# Patient Record
Sex: MALE | Race: WHITE | NOT HISPANIC OR LATINO | Employment: OTHER | URBAN - METROPOLITAN AREA
[De-identification: names, ages, dates, MRNs, and addresses within clinical notes are randomized per-mention and may not be internally consistent; named-entity substitution may affect disease eponyms.]

---

## 2017-01-04 ENCOUNTER — COMMUNICATION - HEALTHEAST (OUTPATIENT)
Dept: PULMONOLOGY | Facility: OTHER | Age: 52
End: 2017-01-04

## 2017-02-01 ENCOUNTER — COMMUNICATION - HEALTHEAST (OUTPATIENT)
Dept: PULMONOLOGY | Facility: OTHER | Age: 52
End: 2017-02-01

## 2017-02-08 ENCOUNTER — RECORDS - HEALTHEAST (OUTPATIENT)
Dept: ADMINISTRATIVE | Facility: OTHER | Age: 52
End: 2017-02-08

## 2017-02-08 ENCOUNTER — OFFICE VISIT - HEALTHEAST (OUTPATIENT)
Dept: PULMONOLOGY | Facility: OTHER | Age: 52
End: 2017-02-08

## 2017-02-08 ENCOUNTER — AMBULATORY - HEALTHEAST (OUTPATIENT)
Dept: PULMONOLOGY | Facility: OTHER | Age: 52
End: 2017-02-08

## 2017-02-08 DIAGNOSIS — Z72.0 TOBACCO ABUSE: ICD-10-CM

## 2017-02-08 DIAGNOSIS — J45.20 MILD INTERMITTENT ASTHMA WITHOUT COMPLICATION: ICD-10-CM

## 2017-02-08 DIAGNOSIS — J45.909 ASTHMA: ICD-10-CM

## 2017-05-09 ENCOUNTER — COMMUNICATION - HEALTHEAST (OUTPATIENT)
Dept: SCHEDULING | Facility: CLINIC | Age: 52
End: 2017-05-09

## 2017-05-17 ENCOUNTER — COMMUNICATION - HEALTHEAST (OUTPATIENT)
Dept: PULMONOLOGY | Facility: OTHER | Age: 52
End: 2017-05-17

## 2017-05-31 ENCOUNTER — COMMUNICATION - HEALTHEAST (OUTPATIENT)
Dept: PULMONOLOGY | Facility: CLINIC | Age: 52
End: 2017-05-31

## 2017-05-31 DIAGNOSIS — J45.40 ASTHMA IN ADULT, MODERATE PERSISTENT, UNCOMPLICATED: ICD-10-CM

## 2017-06-05 ENCOUNTER — AMBULATORY - HEALTHEAST (OUTPATIENT)
Dept: PULMONOLOGY | Facility: OTHER | Age: 52
End: 2017-06-05

## 2017-06-05 DIAGNOSIS — J45.909 ASTHMA: ICD-10-CM

## 2018-06-06 ENCOUNTER — COMMUNICATION - HEALTHEAST (OUTPATIENT)
Dept: PULMONOLOGY | Facility: OTHER | Age: 53
End: 2018-06-06

## 2018-06-06 DIAGNOSIS — J45.909 ASTHMA: ICD-10-CM

## 2018-06-07 ENCOUNTER — AMBULATORY - HEALTHEAST (OUTPATIENT)
Dept: PULMONOLOGY | Facility: OTHER | Age: 53
End: 2018-06-07

## 2018-06-07 DIAGNOSIS — J45.909 ASTHMA: ICD-10-CM

## 2018-07-27 ENCOUNTER — OFFICE VISIT - HEALTHEAST (OUTPATIENT)
Dept: PULMONOLOGY | Facility: OTHER | Age: 53
End: 2018-07-27

## 2018-07-27 DIAGNOSIS — R00.2 PALPITATIONS: ICD-10-CM

## 2018-07-27 LAB
ATRIAL RATE - MUSE: 73 BPM
DIASTOLIC BLOOD PRESSURE - MUSE: NORMAL MMHG
INTERPRETATION ECG - MUSE: NORMAL
P AXIS - MUSE: 75 DEGREES
PR INTERVAL - MUSE: 214 MS
QRS DURATION - MUSE: 104 MS
QT - MUSE: 422 MS
QTC - MUSE: 464 MS
R AXIS - MUSE: 13 DEGREES
SYSTOLIC BLOOD PRESSURE - MUSE: NORMAL MMHG
T AXIS - MUSE: 222 DEGREES
VENTRICULAR RATE- MUSE: 73 BPM

## 2018-07-27 RX ORDER — ASPIRIN 81 MG/1
81 TABLET, CHEWABLE ORAL
Status: SHIPPED | COMMUNITY
Start: 2017-05-11

## 2018-09-17 ENCOUNTER — OFFICE VISIT - HEALTHEAST (OUTPATIENT)
Dept: PULMONOLOGY | Facility: OTHER | Age: 53
End: 2018-09-17

## 2018-09-17 DIAGNOSIS — Z72.0 TOBACCO ABUSE: ICD-10-CM

## 2018-09-17 DIAGNOSIS — B18.2 CHRONIC HEPATITIS C WITHOUT HEPATIC COMA (H): ICD-10-CM

## 2018-09-17 DIAGNOSIS — J45.20 MILD INTERMITTENT ASTHMA WITHOUT COMPLICATION: ICD-10-CM

## 2018-09-17 DIAGNOSIS — I42.9 CARDIOMYOPATHY (H): ICD-10-CM

## 2018-09-17 LAB
ATRIAL RATE - MUSE: 116 BPM
DIASTOLIC BLOOD PRESSURE - MUSE: NORMAL MMHG
INTERPRETATION ECG - MUSE: NORMAL
P AXIS - MUSE: 61 DEGREES
PR INTERVAL - MUSE: 198 MS
QRS DURATION - MUSE: 106 MS
QT - MUSE: 342 MS
QTC - MUSE: 475 MS
R AXIS - MUSE: -11 DEGREES
SYSTOLIC BLOOD PRESSURE - MUSE: NORMAL MMHG
T AXIS - MUSE: 113 DEGREES
VENTRICULAR RATE- MUSE: 116 BPM

## 2018-09-21 ENCOUNTER — OFFICE VISIT - HEALTHEAST (OUTPATIENT)
Dept: CARDIOLOGY | Facility: CLINIC | Age: 53
End: 2018-09-21

## 2018-09-21 DIAGNOSIS — I42.0 DILATED CARDIOMYOPATHY (H): ICD-10-CM

## 2018-09-21 LAB
ANION GAP SERPL CALCULATED.3IONS-SCNC: 7 MMOL/L (ref 5–18)
BUN SERPL-MCNC: 19 MG/DL (ref 8–22)
CALCIUM SERPL-MCNC: 9.5 MG/DL (ref 8.5–10.5)
CHLORIDE BLD-SCNC: 107 MMOL/L (ref 98–107)
CO2 SERPL-SCNC: 26 MMOL/L (ref 22–31)
CREAT SERPL-MCNC: 0.96 MG/DL (ref 0.7–1.3)
GFR SERPL CREATININE-BSD FRML MDRD: >60 ML/MIN/1.73M2
GLUCOSE BLD-MCNC: 106 MG/DL (ref 70–125)
POTASSIUM BLD-SCNC: 4.6 MMOL/L (ref 3.5–5)
SODIUM SERPL-SCNC: 140 MMOL/L (ref 136–145)

## 2018-09-24 LAB — BNP SERPL-MCNC: 978 PG/ML (ref 0–42)

## 2018-09-26 ENCOUNTER — AMBULATORY - HEALTHEAST (OUTPATIENT)
Dept: CARDIOLOGY | Facility: CLINIC | Age: 53
End: 2018-09-26

## 2018-09-26 DIAGNOSIS — R79.89 ELEVATED BRAIN NATRIURETIC PEPTIDE (BNP) LEVEL: ICD-10-CM

## 2018-10-01 ENCOUNTER — HOSPITAL ENCOUNTER (OUTPATIENT)
Dept: CARDIOLOGY | Facility: CLINIC | Age: 53
Discharge: HOME | End: 2018-10-01
Attending: INTERNAL MEDICINE

## 2018-10-01 LAB
AORTIC ROOT: 3.8 CM
AORTIC VALVE MEAN VELOCITY: 56.7 CM/S
AV DIMENSIONLESS INDEX VTI: 0.8
AV MEAN GRADIENT: 1 MMHG
AV PEAK GRADIENT: 2.5 MMHG
AV VALVE AREA: 3 CM2
AV VELOCITY RATIO: 0.9
BSA FOR ECHO PROCEDURE: 1.79 M2
CV BLOOD PRESSURE: NORMAL MMHG
CV ECHO HEIGHT: 69 IN
CV ECHO WEIGHT: 146 LBS
DOP CALC AO PEAK VEL: 79.3 CM/S
DOP CALC AO VTI: 12.5 CM
DOP CALC LVOT AREA: 3.8 CM2
DOP CALC LVOT DIAMETER: 2.2 CM
DOP CALC LVOT PEAK VEL: 72.2 CM/S
DOP CALC LVOT STROKE VOLUME: 38 CM3
DOP CALCLVOT PEAK VEL VTI: 10 CM
ECHO EJECTION FRACTION ESTIMATED: 15 %
EJECTION FRACTION: 21 % (ref 55–75)
FRACTIONAL SHORTENING: 8.5 % (ref 28–44)
INTERVENTRICULAR SEPTUM IN END DIASTOLE: 1 CM (ref 0.6–1)
IVS/PW RATIO: 0.9
LA AREA 1: 25.9 CM2
LA AREA 2: 36.3 CM2
LEFT ATRIUM LENGTH: 5.78 CM
LEFT ATRIUM SIZE: 5.7 CM
LEFT ATRIUM TO AORTIC ROOT RATIO: 1.5 NO UNITS
LEFT ATRIUM VOLUME INDEX: 77.2 ML/M2
LEFT ATRIUM VOLUME: 138.3 ML
LEFT VENTRICLE CARDIAC INDEX: 1.5 L/MIN/M2
LEFT VENTRICLE CARDIAC OUTPUT: 2.7 L/MIN
LEFT VENTRICLE DIASTOLIC VOLUME INDEX: 236.3 CM3/M2 (ref 34–74)
LEFT VENTRICLE DIASTOLIC VOLUME: 423 CM3 (ref 62–150)
LEFT VENTRICLE HEART RATE: 70 BPM
LEFT VENTRICLE MASS INDEX: 252 G/M2
LEFT VENTRICLE SYSTOLIC VOLUME INDEX: 186 CM3/M2 (ref 11–31)
LEFT VENTRICLE SYSTOLIC VOLUME: 333 CM3 (ref 21–61)
LEFT VENTRICULAR INTERNAL DIMENSION IN DIASTOLE: 8.2 CM (ref 4.2–5.8)
LEFT VENTRICULAR INTERNAL DIMENSION IN SYSTOLE: 7.5 CM (ref 2.5–4)
LEFT VENTRICULAR MASS: 451 G
LEFT VENTRICULAR OUTFLOW TRACT MEAN GRADIENT: 1 MMHG
LEFT VENTRICULAR OUTFLOW TRACT MEAN VELOCITY: 46.8 CM/S
LEFT VENTRICULAR OUTFLOW TRACT PEAK GRADIENT: 2 MMHG
LEFT VENTRICULAR POSTERIOR WALL IN END DIASTOLE: 1.1 CM (ref 0.6–1)
LV STROKE VOLUME INDEX: 21.2 ML/M2
MITRAL REGURGITANT VELOCITY TIME INTEGRAL: 135 CM
MR FLOW: 55 CM3
MR MEAN GRADIENT: 54 MMHG
MR MEAN VELOCITY: 342 CM/S
MR PEAK GRADIENT: 92.2 MMHG
MR PISA EROA: 0.4 CM2
MR PISA RADIUS: 0.9 CM
MR PISA VN NYQUIST: 38.5 CM/S
MV E'TISSUE VEL-LAT: 6.73 CM/S
MV E'TISSUE VEL-MED: 2.63 CM/S
MV REGURGITANT VOLUME: 55.1 CC
NUC REST DIASTOLIC VOLUME INDEX: 2336 LBS
NUC REST SYSTOLIC VOLUME INDEX: 69 IN
PISA MR PEAK VEL: 480 CM/S
PR MAX PG: 6 MMHG
PR PEAK VELOCITY: 137 CM/S
TRICUSPID REGURGITATION PEAK PRESSURE GRADIENT: 36 MMHG
TRICUSPID VALVE ANULAR PLANE SYSTOLIC EXCURSION: 1.5 CM
TRICUSPID VALVE PEAK REGURGITANT VELOCITY: 300 CM/S

## 2018-10-01 ASSESSMENT — MIFFLIN-ST. JEOR: SCORE: 1487.63

## 2018-10-15 ENCOUNTER — OFFICE VISIT - HEALTHEAST (OUTPATIENT)
Dept: INTERNAL MEDICINE | Facility: CLINIC | Age: 53
End: 2018-10-15

## 2018-10-15 DIAGNOSIS — E78.5 HYPERLIPIDEMIA: ICD-10-CM

## 2018-10-15 DIAGNOSIS — J45.30 MILD PERSISTENT ASTHMA WITHOUT COMPLICATION: ICD-10-CM

## 2018-10-15 DIAGNOSIS — Z91.030 BEE STING ALLERGY: ICD-10-CM

## 2018-10-15 DIAGNOSIS — F19.11 DRUG ABUSE IN REMISSION (H): ICD-10-CM

## 2018-10-15 DIAGNOSIS — B18.2 CHRONIC HEPATITIS C WITHOUT HEPATIC COMA (H): ICD-10-CM

## 2018-10-15 DIAGNOSIS — Z95.810 S/P ICD (INTERNAL CARDIAC DEFIBRILLATOR) PROCEDURE: ICD-10-CM

## 2018-10-15 DIAGNOSIS — I42.0 DILATED CARDIOMYOPATHY (H): ICD-10-CM

## 2018-10-15 RX ORDER — BUDESONIDE AND FORMOTEROL FUMARATE DIHYDRATE 160; 4.5 UG/1; UG/1
1 AEROSOL RESPIRATORY (INHALATION) 2 TIMES DAILY
Qty: 1 INHALER | Refills: 11 | Status: SHIPPED | OUTPATIENT
Start: 2018-10-15 | End: 2021-07-29

## 2018-10-15 RX ORDER — ALBUTEROL SULFATE 90 UG/1
2 AEROSOL, METERED RESPIRATORY (INHALATION) EVERY 6 HOURS PRN
Qty: 1 INHALER | Refills: 11 | Status: SHIPPED | OUTPATIENT
Start: 2018-10-15 | End: 2021-07-29

## 2018-10-15 RX ORDER — EPINEPHRINE 0.3 MG/.3ML
0.3 INJECTION SUBCUTANEOUS PRN
Qty: 2 | Refills: 3 | Status: SHIPPED | OUTPATIENT
Start: 2018-10-15

## 2018-10-15 ASSESSMENT — MIFFLIN-ST. JEOR: SCORE: 1492.17

## 2018-12-10 ENCOUNTER — COMMUNICATION - HEALTHEAST (OUTPATIENT)
Dept: CARDIOLOGY | Facility: CLINIC | Age: 53
End: 2018-12-10

## 2019-01-18 ENCOUNTER — OFFICE VISIT - HEALTHEAST (OUTPATIENT)
Dept: INTERNAL MEDICINE | Facility: CLINIC | Age: 54
End: 2019-01-18

## 2019-01-18 DIAGNOSIS — B18.2 CHRONIC HEPATITIS C WITHOUT HEPATIC COMA (H): ICD-10-CM

## 2019-01-18 DIAGNOSIS — Z72.0 TOBACCO ABUSE: ICD-10-CM

## 2019-01-18 DIAGNOSIS — J45.30 MILD PERSISTENT ASTHMA WITHOUT COMPLICATION: ICD-10-CM

## 2019-01-18 DIAGNOSIS — F19.10 POLYSUBSTANCE ABUSE (H): ICD-10-CM

## 2019-01-18 DIAGNOSIS — Z51.81 MEDICATION MONITORING ENCOUNTER: ICD-10-CM

## 2019-01-18 DIAGNOSIS — E78.5 HYPERLIPIDEMIA, UNSPECIFIED HYPERLIPIDEMIA TYPE: ICD-10-CM

## 2019-01-18 DIAGNOSIS — R73.03 PREDIABETES: ICD-10-CM

## 2019-01-18 DIAGNOSIS — I50.22 CHRONIC SYSTOLIC (CONGESTIVE) HEART FAILURE (H): ICD-10-CM

## 2019-01-18 DIAGNOSIS — I25.10 NON-OCCLUSIVE CORONARY ARTERY DISEASE: ICD-10-CM

## 2019-01-18 DIAGNOSIS — Z12.5 SCREENING FOR PROSTATE CANCER: ICD-10-CM

## 2019-01-18 DIAGNOSIS — R39.11 URINARY HESITANCY: ICD-10-CM

## 2019-01-18 LAB
ALBUMIN SERPL-MCNC: 3.5 G/DL (ref 3.5–5)
ALP SERPL-CCNC: 95 U/L (ref 45–120)
ALT SERPL W P-5'-P-CCNC: 32 U/L (ref 0–45)
AMPHETAMINES UR QL SCN: ABNORMAL
ANION GAP SERPL CALCULATED.3IONS-SCNC: 9 MMOL/L (ref 5–18)
AST SERPL W P-5'-P-CCNC: 28 U/L (ref 0–40)
BARBITURATES UR QL: ABNORMAL
BENZODIAZ UR QL: ABNORMAL
BILIRUB DIRECT SERPL-MCNC: 0.3 MG/DL
BILIRUB SERPL-MCNC: 1 MG/DL (ref 0–1)
BUN SERPL-MCNC: 15 MG/DL (ref 8–22)
CALCIUM SERPL-MCNC: 8.9 MG/DL (ref 8.5–10.5)
CANNABINOIDS UR QL SCN: ABNORMAL
CHLORIDE BLD-SCNC: 105 MMOL/L (ref 98–107)
CO2 SERPL-SCNC: 25 MMOL/L (ref 22–31)
COCAINE UR QL: ABNORMAL
CREAT SERPL-MCNC: 0.9 MG/DL (ref 0.7–1.3)
CREAT UR-MCNC: 173.5 MG/DL
GFR SERPL CREATININE-BSD FRML MDRD: >60 ML/MIN/1.73M2
GLUCOSE BLD-MCNC: 90 MG/DL (ref 70–125)
HBA1C MFR BLD: 5.4 % (ref 3.5–6)
HGB BLD-MCNC: 13.9 G/DL (ref 14–18)
METHADONE UR QL SCN: ABNORMAL
OPIATES UR QL SCN: ABNORMAL
OXYCODONE UR QL: ABNORMAL
PCP UR QL SCN: ABNORMAL
POTASSIUM BLD-SCNC: 4.2 MMOL/L (ref 3.5–5)
PROT SERPL-MCNC: 6.6 G/DL (ref 6–8)
SODIUM SERPL-SCNC: 139 MMOL/L (ref 136–145)

## 2019-01-18 ASSESSMENT — MIFFLIN-ST. JEOR: SCORE: 1523.92

## 2019-01-19 ENCOUNTER — COMMUNICATION - HEALTHEAST (OUTPATIENT)
Dept: SCHEDULING | Facility: CLINIC | Age: 54
End: 2019-01-19

## 2019-01-19 ENCOUNTER — COMMUNICATION - HEALTHEAST (OUTPATIENT)
Dept: INTERNAL MEDICINE | Facility: CLINIC | Age: 54
End: 2019-01-19

## 2019-01-19 DIAGNOSIS — I42.0 DILATED CARDIOMYOPATHY (H): ICD-10-CM

## 2019-01-19 DIAGNOSIS — I50.22 CHRONIC SYSTOLIC (CONGESTIVE) HEART FAILURE (H): ICD-10-CM

## 2019-01-21 ENCOUNTER — AMBULATORY - HEALTHEAST (OUTPATIENT)
Dept: INTERNAL MEDICINE | Facility: CLINIC | Age: 54
End: 2019-01-21

## 2019-03-28 ENCOUNTER — OFFICE VISIT - HEALTHEAST (OUTPATIENT)
Dept: INTERNAL MEDICINE | Facility: CLINIC | Age: 54
End: 2019-03-28

## 2019-03-28 DIAGNOSIS — I50.22 CHRONIC SYSTOLIC (CONGESTIVE) HEART FAILURE (H): ICD-10-CM

## 2019-03-28 DIAGNOSIS — R73.03 PREDIABETES: ICD-10-CM

## 2019-03-28 DIAGNOSIS — I42.0 DILATED CARDIOMYOPATHY (H): ICD-10-CM

## 2019-03-28 DIAGNOSIS — Z01.818 ENCOUNTER FOR PREOPERATIVE EXAMINATION FOR GENERAL SURGICAL PROCEDURE: ICD-10-CM

## 2019-03-28 DIAGNOSIS — I25.10 NON-OCCLUSIVE CORONARY ARTERY DISEASE: ICD-10-CM

## 2019-03-28 DIAGNOSIS — J45.30 MILD PERSISTENT ASTHMA WITHOUT COMPLICATION: ICD-10-CM

## 2019-03-28 DIAGNOSIS — Z95.810 S/P ICD (INTERNAL CARDIAC DEFIBRILLATOR) PROCEDURE: ICD-10-CM

## 2019-03-28 DIAGNOSIS — F19.10 POLYSUBSTANCE ABUSE (H): ICD-10-CM

## 2019-03-28 DIAGNOSIS — K11.8 MASS OF PAROTID GLAND: ICD-10-CM

## 2019-03-28 LAB
ANION GAP SERPL CALCULATED.3IONS-SCNC: 11 MMOL/L (ref 5–18)
BUN SERPL-MCNC: 23 MG/DL (ref 8–22)
CALCIUM SERPL-MCNC: 9.7 MG/DL (ref 8.5–10.5)
CHLORIDE BLD-SCNC: 105 MMOL/L (ref 98–107)
CO2 SERPL-SCNC: 22 MMOL/L (ref 22–31)
CREAT SERPL-MCNC: 1.08 MG/DL (ref 0.7–1.3)
GFR SERPL CREATININE-BSD FRML MDRD: >60 ML/MIN/1.73M2
GLUCOSE BLD-MCNC: 119 MG/DL (ref 70–125)
HBA1C MFR BLD: 5.2 % (ref 3.5–6)
HGB BLD-MCNC: 15.1 G/DL (ref 14–18)
POTASSIUM BLD-SCNC: 4.1 MMOL/L (ref 3.5–5)
SODIUM SERPL-SCNC: 138 MMOL/L (ref 136–145)

## 2019-03-28 ASSESSMENT — MIFFLIN-ST. JEOR: SCORE: 1519.38

## 2019-03-29 ENCOUNTER — COMMUNICATION - HEALTHEAST (OUTPATIENT)
Dept: INTERNAL MEDICINE | Facility: CLINIC | Age: 54
End: 2019-03-29

## 2019-05-23 ENCOUNTER — COMMUNICATION - HEALTHEAST (OUTPATIENT)
Dept: INTERNAL MEDICINE | Facility: CLINIC | Age: 54
End: 2019-05-23

## 2019-10-24 ENCOUNTER — COMMUNICATION - HEALTHEAST (OUTPATIENT)
Dept: CARDIOLOGY | Facility: CLINIC | Age: 54
End: 2019-10-24

## 2019-12-02 ENCOUNTER — COMMUNICATION - HEALTHEAST (OUTPATIENT)
Dept: PHARMACY | Facility: CLINIC | Age: 54
End: 2019-12-02

## 2019-12-26 ENCOUNTER — COMMUNICATION - HEALTHEAST (OUTPATIENT)
Dept: CARDIOLOGY | Facility: CLINIC | Age: 54
End: 2019-12-26

## 2020-03-02 ENCOUNTER — OFFICE VISIT - HEALTHEAST (OUTPATIENT)
Dept: INTERNAL MEDICINE | Facility: CLINIC | Age: 55
End: 2020-03-02

## 2020-03-02 DIAGNOSIS — E78.5 HYPERLIPIDEMIA, UNSPECIFIED HYPERLIPIDEMIA TYPE: ICD-10-CM

## 2020-03-02 DIAGNOSIS — F19.10 POLYSUBSTANCE ABUSE (H): ICD-10-CM

## 2020-03-02 DIAGNOSIS — I42.0 DILATED CARDIOMYOPATHY (H): ICD-10-CM

## 2020-03-02 DIAGNOSIS — I47.29 NSVT (NONSUSTAINED VENTRICULAR TACHYCARDIA) (H): ICD-10-CM

## 2020-03-02 DIAGNOSIS — I50.22 CHRONIC SYSTOLIC (CONGESTIVE) HEART FAILURE (H): ICD-10-CM

## 2020-03-02 ASSESSMENT — MIFFLIN-ST. JEOR: SCORE: 1483.1

## 2020-05-07 ENCOUNTER — COMMUNICATION - HEALTHEAST (OUTPATIENT)
Dept: CARDIOLOGY | Facility: CLINIC | Age: 55
End: 2020-05-07

## 2021-01-06 ENCOUNTER — COMMUNICATION - HEALTHEAST (OUTPATIENT)
Dept: ADMINISTRATIVE | Facility: CLINIC | Age: 56
End: 2021-01-06

## 2021-03-09 ENCOUNTER — OFFICE VISIT - HEALTHEAST (OUTPATIENT)
Dept: INTERNAL MEDICINE | Facility: CLINIC | Age: 56
End: 2021-03-09

## 2021-03-09 DIAGNOSIS — J45.30 MILD PERSISTENT ASTHMA WITHOUT COMPLICATION: ICD-10-CM

## 2021-03-09 DIAGNOSIS — F19.10 POLYSUBSTANCE ABUSE (H): ICD-10-CM

## 2021-03-09 DIAGNOSIS — I25.10 NON-OCCLUSIVE CORONARY ARTERY DISEASE: ICD-10-CM

## 2021-03-09 DIAGNOSIS — Z51.81 ENCOUNTER FOR THERAPEUTIC DRUG MONITORING: ICD-10-CM

## 2021-03-09 DIAGNOSIS — Z72.0 TOBACCO ABUSE: ICD-10-CM

## 2021-03-09 DIAGNOSIS — F41.1 GENERALIZED ANXIETY DISORDER: ICD-10-CM

## 2021-03-09 DIAGNOSIS — I50.22 CHRONIC SYSTOLIC (CONGESTIVE) HEART FAILURE (H): ICD-10-CM

## 2021-03-09 DIAGNOSIS — I47.29 NSVT (NONSUSTAINED VENTRICULAR TACHYCARDIA) (H): ICD-10-CM

## 2021-03-09 LAB
ANION GAP SERPL CALCULATED.3IONS-SCNC: 9 MMOL/L (ref 5–18)
BUN SERPL-MCNC: 22 MG/DL (ref 8–22)
CALCIUM SERPL-MCNC: 8.3 MG/DL (ref 8.5–10.5)
CHLORIDE BLD-SCNC: 106 MMOL/L (ref 98–107)
CO2 SERPL-SCNC: 23 MMOL/L (ref 22–31)
CREAT SERPL-MCNC: 0.98 MG/DL (ref 0.7–1.3)
GFR SERPL CREATININE-BSD FRML MDRD: >60 ML/MIN/1.73M2
GLUCOSE BLD-MCNC: 102 MG/DL (ref 70–125)
MAGNESIUM SERPL-MCNC: 1.8 MG/DL (ref 1.8–2.6)
POTASSIUM BLD-SCNC: 4.3 MMOL/L (ref 3.5–5)
SODIUM SERPL-SCNC: 138 MMOL/L (ref 136–145)

## 2021-03-09 RX ORDER — ESCITALOPRAM OXALATE 10 MG/1
10 TABLET ORAL DAILY
Qty: 30 TABLET | Refills: 2 | Status: SHIPPED | OUTPATIENT
Start: 2021-03-09 | End: 2021-07-29 | Stop reason: DRUGHIGH

## 2021-03-09 RX ORDER — METOPROLOL SUCCINATE 50 MG/1
50 TABLET, EXTENDED RELEASE ORAL DAILY
Qty: 30 TABLET | Refills: 11 | Status: SHIPPED
Start: 2021-03-09

## 2021-03-09 RX ORDER — LISINOPRIL 5 MG/1
5 TABLET ORAL DAILY
Qty: 90 TABLET | Refills: 3 | Status: SHIPPED
Start: 2021-03-09 | End: 2021-07-29

## 2021-03-09 RX ORDER — HYDROXYZINE HYDROCHLORIDE 10 MG/1
10 TABLET, FILM COATED ORAL EVERY 8 HOURS PRN
Qty: 30 TABLET | Refills: 1 | Status: SHIPPED | OUTPATIENT
Start: 2021-03-09 | End: 2021-07-29 | Stop reason: DRUGHIGH

## 2021-03-09 RX ORDER — FUROSEMIDE 20 MG
20 TABLET ORAL DAILY
Qty: 45 TABLET | Refills: 3 | Status: ON HOLD
Start: 2021-03-09 | End: 2021-07-31

## 2021-03-09 ASSESSMENT — MIFFLIN-ST. JEOR: SCORE: 1474.02

## 2021-03-10 ENCOUNTER — COMMUNICATION - HEALTHEAST (OUTPATIENT)
Dept: INTERNAL MEDICINE | Facility: CLINIC | Age: 56
End: 2021-03-10

## 2021-05-03 ENCOUNTER — COMMUNICATION - HEALTHEAST (OUTPATIENT)
Dept: INTERNAL MEDICINE | Facility: CLINIC | Age: 56
End: 2021-05-03

## 2021-05-03 DIAGNOSIS — E78.5 HYPERLIPIDEMIA, UNSPECIFIED HYPERLIPIDEMIA TYPE: ICD-10-CM

## 2021-05-04 RX ORDER — ATORVASTATIN CALCIUM 40 MG/1
TABLET, FILM COATED ORAL
Qty: 90 TABLET | Refills: 3 | Status: SHIPPED | OUTPATIENT
Start: 2021-05-04 | End: 2022-01-01

## 2021-05-27 NOTE — PROGRESS NOTES
Preoperative Exam    Scheduled Procedure: Cancerous mass removed from right cheek  Surgery Date:     Surgery Location: [facility]    Surgeon:  Dr Echevarria    Assessment/Plan:     1. Encounter for preoperative examination for general surgical procedure  Following cardiac clearance, may proceed with surgery as planned.  Overall risk is relatively low and there are no contraindications to proceeding with surgery and general anesthesia.  He will need to hold aspirin for 1 week prior to surgery.  - Basic Metabolic Panel  - Hemoglobin    2. Mass of parotid gland  Incidentally found 2 small masses in right parotid gland with plans for parotidectomy    3. Chronic systolic (congestive) heart failure (H) with dilated cardiomyopathy  Appears well compensated with current medical management.  He will need to be evaluated by cardiology prior to clearance for surgery.  - Ambulatory referral to Cardiology  - lisinopril (ZESTRIL) 10 MG tablet; Take 1 tablet (10 mg total) by mouth daily.  Dispense: 60 tablet; Refill: 11    On morning of surgery, he should take his usual dose of Toprol-XL.  He should hold lisinopril and furosemide although these should be resumed postoperatively.      5. Non-occlusive coronary artery disease  Stable without any exertional chest pain.  Cardiac clearance plan    6. S/P ICD (internal cardiac defibrillator) procedure      7. Mild persistent asthma without complication  Stable.  Continue Symbicort twice daily.  He should use on morning of surgery    8. Polysubstance abuse (H)  History of THC and methamphetamine abuse.  Recent urine drug screen positive for amphetamine.    9. Prediabetes    - Glycosylated Hemoglobin A1c        Surgical Procedure Risk: Intermediate (reported cardiac risk generally 1-5%)  Have you had prior anesthesia?: Yes  Have you or any family members had a previous anesthesia reaction:  No  Do you or any family members have a history of a clotting or bleeding disorder?: No  Cardiac  Risk Assessment: increased risk for major cardiac complications based on  congestive heart failure    Patient approved for surgery with general or local anesthesia.  Following cardiac clearance        Functional Status: Independent  Patient plans to recover at home with family.     Subjective:      Fan Villagomez is a 53 y.o. male who presents for a preoperative consultation.  History of dilated cardiomyopathy with chronic systolic congestive heart failure.  Nonocclusive coronary artery disease.  History of ICD placement.  Also history of polysubstance abuse including THC and methamphetamines.  Recent ER evaluation for chest pain felt to be noncardiac.  Workup included CTA of head and neck with incidental finding of 2 indeterminate low-density foci in the right parotid gland measuring 1.3 cm and 1 cm.  Evaluated by ENT with recommendation for parotidectomy as malignancy cannot be excluded.    From a CHF standpoint, he appears to be stable.  Denies any dyspnea, orthopnea or edema.  Recent episode of chest pain felt to be noncardiac but will first be evaluated by cardiology prior to clearance for surgery.    He has no history of problems with surgery or general anesthesia.  No personal or family history of DVT or pulmonary embolus.    All other systems reviewed and are negative, other than those listed in the HPI.    Pertinent History  Do you have difficulty breathing or chest pain after walking up a flight of stairs: No  History of obstructive sleep apnea: No  Steroid use in the last 6 months: No  Frequent Aspirin/NSAID use: No  Prior Blood Transfusion: No  Prior Blood Transfusion Reaction: No  If for some reason prior to, during or after the procedure, if it is medically indicated, would you be willing to have a blood transfusion?:  There is no transfusion refusal.    Current Outpatient Medications   Medication Sig Dispense Refill     albuterol (PROAIR HFA;PROVENTIL HFA;VENTOLIN HFA) 90 mcg/actuation inhaler  Inhale 2 puffs every 6 (six) hours as needed for wheezing. 1 Inhaler 11     aspirin 81 mg chewable tablet Chew 81 mg.       atorvastatin (LIPITOR) 40 MG tablet TAKE 1 TAB BY MOUTH DAILY AT BEDTIME.       budesonide-formoterol (SYMBICORT) 160-4.5 mcg/actuation inhaler Inhale 1 puff 2 (two) times a day. 1 Inhaler 11     EPINEPHrine (EPIPEN/ADRENACLICK/AUVI-Q) 0.3 mg/0.3 mL injection Inject 0.3 mL (0.3 mg total) as directed as needed for anaphylaxis. Inject into thigh. 2 Pre-filled Pen Syringe 3     furosemide (LASIX) 20 MG tablet Take 1 tablet (20 mg total) by mouth every other day. 45 tablet 3     metoprolol succinate (TOPROL XL) 25 MG Take 1 tablet (25 mg total) by mouth daily. 30 tablet 11     lisinopril (ZESTRIL) 10 MG tablet Take 1 tablet (10 mg total) by mouth daily. 60 tablet 11     No current facility-administered medications for this visit.         Allergies   Allergen Reactions     No Known Drug Allergies        Patient Active Problem List   Diagnosis     Bee sting allergy     Chronic hepatitis C (H)     Dilated cardiomyopathy (H)     Chronic systolic (congestive) heart failure (H)     Hyperlipidemia     Mild persistent asthma without complication     Non-occlusive coronary artery disease     Palpitations     Prediabetes     S/P ICD (internal cardiac defibrillator) procedure     Tobacco abuse     Urinary hesitancy     Polysubstance abuse (H)     Mass of parotid gland       Past Medical History:   Diagnosis Date     Bee sting allergy 10/15/2018     Chronic hepatitis C (H)     Evaluated by gastroenterology 2015 with fibro-scan showing only very mild fibrosis.  One-year follow-up recommended.  Normal LFTs October 2018.     Chronic systolic (congestive) heart failure (H)     Dilated cardiomyopathy with EF 10-15% and severe mitral regurgitation     Dilated cardiomyopathy (H)     Echocardiogram October 2018 with EF of 10-15% with severe mitral regurgitation and placement of ICD.  Hospitalized October 7, 2018 with  acute CHF     Hyperlipidemia      Mass of parotid gland 3/28/2019    Incidental finding of 2 small masses in right parotid gland     Mild persistent asthma without complication 10/8/2018     Non-occlusive coronary artery disease 2017    Coronary angiogram 2018 with moderate 40-60% lesions involving multiple vessels     Palpitations 2018     Polysubstance abuse (H)     Methamphetamine and THC.  Positive urine drug screen 2019 for THC and amphetamine.  Recommending chemical dependency treatment     Prediabetes     A1c 5.7% May 2017     Pulmonary nodule     No change from  -     S/P ICD (internal cardiac defibrillator) procedure 10/15/2018    Overview:  [employer] single chamber ICD     Tobacco abuse 2014     Urinary hesitancy 2019    BPH suspected.  Symptoms intermittent       Past Surgical History:   Procedure Laterality Date     CARDIAC DEFIBRILLATOR PLACEMENT  10/2018     FOOT SURGERY Left      surgery     HAND SURGERY Right     [facility]      INGUINAL HERNIA REPAIR Right        Social History     Socioeconomic History     Marital status: Single     Spouse name: Not on file     Number of children: Not on file     Years of education: Not on file     Highest education level: Not on file   Occupational History     Not on file   Social Needs     Financial resource strain: Not on file     Food insecurity:     Worry: Not on file     Inability: Not on file     Transportation needs:     Medical: Not on file     Non-medical: Not on file   Tobacco Use     Smoking status: Former Smoker     Packs/day: 1.00     Years: 39.00     Pack years: 39.00     Types: Cigarettes     Last attempt to quit: 2016     Years since quittin.4     Smokeless tobacco: Never Used   Substance and Sexual Activity     Alcohol use: Yes     Comment: rare     Drug use: Yes     Types: Methamphetamines, Marijuana     Comment: infrequent, last used meth      Sexual activity: Yes     Partners:  "Female   Lifestyle     Physical activity:     Days per week: Not on file     Minutes per session: Not on file     Stress: Not on file   Relationships     Social connections:     Talks on phone: Not on file     Gets together: Not on file     Attends Yarsani service: Not on file     Active member of club or organization: Not on file     Attends meetings of clubs or organizations: Not on file     Relationship status: Not on file     Intimate partner violence:     Fear of current or ex partner: Not on file     Emotionally abused: Not on file     Physically abused: Not on file     Forced sexual activity: Not on file   Other Topics Concern     Not on file   Social History Narrative    Lives with his brother, Atul.  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  One son, Don.               Objective:     Vitals:    03/28/19 1101   BP: 122/70   Pulse: (!) 119   SpO2: 98%   Weight: 153 lb (69.4 kg)   Height: 5' 9\" (1.753 m)         Physical Exam:  HEENT normal  RESPIRATORY: Breathing pattern was normal and the chest moved symmetrically.   Lung sounds were normal and there were no rales or wheezes.  CARDIOVASCULAR: Heart rate and rhythm were normal.  S1 and S2 were normal and there were no extra sounds or murmurs. Peripheral pulses in arms and legs were normal.  Jugular venous pressure was normal.  There was no peripheral edema.  No carotid bruits.  GASTROINTESTINAL: The abdomen was normal in contour.  Bowel sounds were present.   Palpation detected no tenderness, mass, or enlarged organs.   SKIN/HAIR/NAILS: No cyanosis or pallor  NEUROLOGIC: Grossly nonfocal  PSYCHIATRIC:  Mood and affect were normal     Patient Instructions     Hold all supplements, aspirin and NSAIDs for 7 days prior to surgery.  Follow your surgeon's direction on when to stop eating and drinking prior to surgery.  Your surgeon will be managing your pain after your surgery.    Take your usual doses of Toprol-XL and lisinopril on morning of surgery " with small sip of water and also use your Symbicort inhaler          Labs:  Potassium 4.1 creatinine 1.08  Hemoglobin 15.1    Immunization History   Administered Date(s) Administered     Hep B, Adult 03/21/2006     Influenza, Seasonal, Inj PF IIV3 10/20/2011     Influenza,seasonal quad, PF, 36+MOS 10/12/2018     Influenza,seasonal, Inj IIV3 10/20/2011     Pneumo Conj 13-V (2010&after) 06/02/2015     Td, Adult, Absorbed 05/01/2005, 05/20/2011     Tdap 06/02/2015           Electronically signed by Aguila Harrison MD 03/28/19 11:03 AM

## 2021-05-27 NOTE — PATIENT INSTRUCTIONS - HE
Hold all supplements, aspirin and NSAIDs for 7 days prior to surgery.  Follow your surgeon's direction on when to stop eating and drinking prior to surgery.  Your surgeon will be managing your pain after your surgery.    Take your usual doses of Toprol-XL and lisinopril on morning of surgery with small sip of water and also use your Symbicort inhaler

## 2021-05-28 ASSESSMENT — ASTHMA QUESTIONNAIRES
ACT_TOTALSCORE: 18
ACT_TOTALSCORE: 24

## 2021-05-29 NOTE — TELEPHONE ENCOUNTER
New Appointment Needed  What is the reason for the visit:  Pre op   Pre-Op Appt Request  When is the surgery? :  05/24/19  [place]  Who is the surgeon? :  He dose not  know and dose not have paperwork he said   What type of surgery is being done?: cutting out of cancer in the side of face   Provider Preference: Any available  How soon do you need to be seen?: today  Waitlist offered?: No  Okay to leave a detailed message:  Yes

## 2021-05-29 NOTE — TELEPHONE ENCOUNTER
Left message for patient to call back and schedule with another provider, Dr. Harrison can't fit him in today. He needs to get information about who is doing the surgery and where it needs to be sent. (A fax number) [facility]

## 2021-05-30 VITALS — BODY MASS INDEX: 23.48 KG/M2 | WEIGHT: 159 LBS

## 2021-06-01 VITALS — WEIGHT: 147 LBS | BODY MASS INDEX: 21.71 KG/M2

## 2021-06-02 VITALS — HEIGHT: 69 IN | BODY MASS INDEX: 21.62 KG/M2 | WEIGHT: 146 LBS

## 2021-06-02 VITALS — BODY MASS INDEX: 22.81 KG/M2 | WEIGHT: 154 LBS | HEIGHT: 69 IN

## 2021-06-02 VITALS — WEIGHT: 153 LBS | HEIGHT: 69 IN | BODY MASS INDEX: 22.66 KG/M2

## 2021-06-02 VITALS — BODY MASS INDEX: 21.56 KG/M2 | WEIGHT: 146 LBS

## 2021-06-02 VITALS — BODY MASS INDEX: 21.74 KG/M2 | WEIGHT: 147.2 LBS

## 2021-06-02 VITALS — BODY MASS INDEX: 21.77 KG/M2 | WEIGHT: 147 LBS | HEIGHT: 69 IN

## 2021-06-03 NOTE — TELEPHONE ENCOUNTER
Received MTM referral from patient's insurance ([employer])     Attempt: 4, 12/2/19  Result: No answer    Thank you for the referral,    Abla Lawson, MTM Coordinator Intern

## 2021-06-04 VITALS
BODY MASS INDEX: 21.48 KG/M2 | DIASTOLIC BLOOD PRESSURE: 52 MMHG | WEIGHT: 145 LBS | HEIGHT: 69 IN | SYSTOLIC BLOOD PRESSURE: 94 MMHG | HEART RATE: 71 BPM | OXYGEN SATURATION: 99 %

## 2021-06-05 VITALS
OXYGEN SATURATION: 97 % | SYSTOLIC BLOOD PRESSURE: 94 MMHG | DIASTOLIC BLOOD PRESSURE: 60 MMHG | WEIGHT: 143 LBS | HEIGHT: 69 IN | BODY MASS INDEX: 21.18 KG/M2 | HEART RATE: 102 BPM | TEMPERATURE: 97.8 F

## 2021-06-06 NOTE — PROGRESS NOTES
Office Visit - Follow Up   Fan Villagomez   54 y.o. male    Date of Visit: 3/2/2020    Chief Complaint   Patient presents with     Hospital Visit Follow Up        Assessment and Plan   1. Dilated cardiomyopathy (H) with chronic systolic congestive heart failure  Recent echocardiogram with EF 20%.  Followed by cardiology.  Continues on furosemide, Toprol-XL, and lisinopril.  He is experiencing orthostatic hypotension feeling lightheaded when he stands up too quickly and his systolic blood pressure is in the 90s.  Will cut back on lisinopril to only 5 mg daily.  - furosemide (LASIX) 20 MG tablet; Take 1 tablet (20 mg total) by mouth every other day.  Dispense: 45 tablet; Refill: 3  - metoprolol succinate (TOPROL XL) 25 MG; Take 1 tablet (25 mg total) by mouth daily.  Dispense: 90 tablet; Refill: 3        3. NSVT (nonsustained ventricular tachycardia) (H)  Hospitalized with NSVT with discharge of ICD.  Not taking metoprolol and using methamphetamine both contributing to episode.    4. Polysubstance abuse (H)  Urging him to avoid methamphetamine    5. Hyperlipidemia, unspecified hyperlipidemia type    - atorvastatin (LIPITOR) 40 MG tablet; TAKE 1 TAB BY MOUTH DAILY AT BEDTIME.  Dispense: 90 tablet; Refill: 3    Return in about 3 months (around 6/2/2020) for Recheck.     History of Present Illness   This 54 y.o. old male with dilated cardiomyopathy with chronic systolic congestive heart failure with ICD placement.  Hospitalized last week after his defibrillator discharged twice.  Found to have nonsustained ventricular tachycardia.  Symptoms included feeling diaphoretic before the defibrillator fired.  Initially treated with amiodarone drip.  Urine tox screen returned positive for methamphetamine and THC.  He admits to smoking marijuana that was inadvertently laced with methamphetamine.  He declined going to chemical dependency.  Also had missed a couple doses of his Toprol-XL.  He is now back on all of his  medications.  Still feels some soreness where his defibrillator fired.  Describing feeling lightheaded when he stands up too quickly.  No increasing shortness of breath or worsening edema.  No longer working.  Permanent disability secondary to CHF symptoms including chronic shortness of breath and decreased exercise tolerance with easy fatigue.    Review of Systems:  Otherwise, a comprehensive review of systems was negative except as noted.     Medications, Allergies and Problem List   Patient Active Problem List   Diagnosis     Bee sting allergy     Chronic hepatitis C (H)     Dilated cardiomyopathy (H)     Chronic systolic (congestive) heart failure (H)     Hyperlipidemia     Mild persistent asthma without complication     Non-occlusive coronary artery disease     Palpitations     Prediabetes     S/P ICD (internal cardiac defibrillator) procedure     Tobacco abuse     Urinary hesitancy     Polysubstance abuse (H)     NSVT (nonsustained ventricular tachycardia) (H)       He has a past surgical history that includes Hand surgery (Right); Inguinal hernia repair (Right); Foot surgery (Left); and Cardiac defibrillator placement (10/2018).    Allergies   Allergen Reactions     No Known Drug Allergies        Current Outpatient Medications   Medication Sig Dispense Refill     albuterol (PROAIR HFA;PROVENTIL HFA;VENTOLIN HFA) 90 mcg/actuation inhaler Inhale 2 puffs every 6 (six) hours as needed for wheezing. 1 Inhaler 11     aspirin 81 mg chewable tablet Chew 81 mg.       atorvastatin (LIPITOR) 40 MG tablet TAKE 1 TAB BY MOUTH DAILY AT BEDTIME. 90 tablet 3     budesonide-formoterol (SYMBICORT) 160-4.5 mcg/actuation inhaler Inhale 1 puff 2 (two) times a day. 1 Inhaler 11     EPINEPHrine (EPIPEN/ADRENACLICK/AUVI-Q) 0.3 mg/0.3 mL injection Inject 0.3 mL (0.3 mg total) as directed as needed for anaphylaxis. Inject into thigh. 2 Pre-filled Pen Syringe 3     furosemide (LASIX) 20 MG tablet Take 1 tablet (20 mg total) by mouth  "every other day. 45 tablet 3     metoprolol succinate (TOPROL XL) 25 MG Take 1 tablet (25 mg total) by mouth daily. 90 tablet 3     lisinopriL (PRINIVIL,ZESTRIL) 5 MG tablet Take 1 tablet (5 mg total) by mouth daily. 90 tablet 3     No current facility-administered medications for this visit.         Physical Exam   General Appearance:   Well-appearing middle-age male    BP 94/52 (Patient Site: Left Arm, Patient Position: Sitting, Cuff Size: Adult Large)   Pulse 71   Ht 5' 9\" (1.753 m)   Wt 145 lb (65.8 kg)   SpO2 99%   BMI 21.41 kg/m      Respiratory: Normal respiratory effort.  Lungs are clear with no rales or wheezes.  Heart: Regular rate and rhythm without murmurs, rubs, or gallops.   Extremities: No peripheral edema.  Neurologic: Grossly nonfocal           Additional Information   Social History     Tobacco Use     Smoking status: Former Smoker     Packs/day: 1.00     Years: 39.00     Pack years: 39.00     Types: Cigarettes     Last attempt to quit: 11/1/2016     Years since quitting: 3.3     Smokeless tobacco: Never Used   Substance Use Topics     Alcohol use: Yes     Comment: rare     Drug use: Yes     Types: Methamphetamines, Marijuana     Comment: infrequent, last used meth 2010              Aguila Harrison MD  "

## 2021-06-08 NOTE — PROGRESS NOTES
Pulmonary Clinic Follow Up    Cc: follow up asthma, tobacco abuse    HPI: 51yoM with history of tobacco abuse and asthma here for follow up. I first met him 12/2014 after he presented to ER for asthma exacerbation and CT scan found a 5-6mm nodule in the right middle lobe. He has had ongoing issues with insurance coverage, unfortunately and has had difficulty affording the increase in [employer] premiums.    Good new is that he hasn't smoked for 3 months, quit just before his birthday. He did go to the ER 12/2016 after he forgot his rescue inhaler at home and was at work and was exposed to the cold. He then ran out of symbicort and could't afford it due to increase in cost. Did get some Symbicort from clinic as samples. Only using once daily as he wants to make it last. Using emergency 1-2 times per week. Does get short of breath with heavy exertion but otherwise doing ok.    CAT: 4+3+4+4+4=19    Current Outpatient Prescriptions   Medication Sig     albuterol (PROVENTIL HFA;VENTOLIN HFA) 90 mcg/actuation inhaler Inhale 2 puffs every 6 (six) hours as needed for wheezing.     budesonide-formoterol (SYMBICORT) 80-4.5 mcg/actuation inhaler Inhale 2 puffs 2 (two) times a day.     Vitals:    02/08/17 0958   BP: 128/68   Pulse: (!) 106   Resp: 19   SpO2: 98%   RA  Gen: NAD  C/V: RRR, S1 and S2.  Resp: clear bilaterally  Ext: no edema, no clubbing    CT scan-personally reviewed with patient. Mild apical scarring-chronic. 6mm nodule in right middle lobe stable x2 years.      ASSESSMENT/PLAN:  1) Asthma-mild intermittent  -Completed paperwork for [employer] program to help with cost  -Encouraged him to take BID once he has an ongoing supply of ICS/LABA    2) Lung Nodule  -Stable x2 years, no further follow up necessary    3) Tobacco abuse-inremission  -encouragement provided    Follow up in 6 months; going forward he could likely be followed by his PCP for this mild asthma as his health maintenance is going to be more important  as he ages.  Rica Neumann MD  Electronically signed on 2/8/2017 2:51 PM

## 2021-06-14 NOTE — TELEPHONE ENCOUNTER
Nely is calling from Home Health .  Patient has been discharged from [facility], and needs to verify that PCP will follow patient for home health care.

## 2021-06-15 NOTE — PROGRESS NOTES
Assessment & Plan     Chronic systolic (congestive) heart failure (H)  Dilated cardiomyopathy secondary to chronic meth abuse.  Recent hospitalization with acute on chronic exacerbation of systolic congestive heart failure.  He was not watching his sodium carefully and gained 20 pounds.  Responded to diuresis with IV Lasix and discharged on Lasix 20 mg daily along with ACE inhibitor and beta-blocker.  Trying to watch his sodium more carefully.  He has stopped using frozen dinners.  Taking medications faithfully.  Denies any increasing shortness of breath, orthopnea, PND or edema.  Continue current doses of Lasix 20 mg daily, lisinopril 5 mg daily and Toprol-XL 50 mg daily.  - metoprolol succinate (TOPROL XL) 50 MG 24 hr tablet  Dispense: 30 tablet; Refill: 11  - furosemide (LASIX) 20 MG tablet  Dispense: 45 tablet; Refill: 3  - lisinopriL (PRINIVIL,ZESTRIL) 5 MG tablet  Dispense: 90 tablet; Refill: 3    NSVT (nonsustained ventricular tachycardia) (H)  History of NSVT with ICD placement.    Polysubstance abuse (H)  History of polysubstance abuse including methamphetamines.  He denies any use in the last 3 months and none in the 2 months leading up to hospitalization.    Non-occlusive coronary artery disease  Continue medical management of nonocclusive coronary artery disease taking aspirin 81 mg daily along with atorvastatin.  We discussed smoking cessation.    Generalized anxiety disorder  Generalized anxiety disorder with frequent panic attacks.  Prescribed lorazepam during hospitalization and is asking for refill.  We discussed the habit-forming nature of this medication and with his history of polysubstance abuse, this would not be an ideal medication for him to continue.  As an alternative, I am recommending hydroxyzine 10 mg every 8 hours as needed for anxiety.  I would also recommend starting Lexapro 10 mg daily which he can continue long-term and hopefully will help control his chronic anxiety symptoms as  well.  We discussed potential side effects of medications and expectation that it will take 3 to 4 weeks for the Lexapro to start working.  - escitalopram oxalate (LEXAPRO) 10 MG tablet  Dispense: 30 tablet; Refill: 2  - hydrOXYzine HCL (ATARAX) 10 MG tablet  Dispense: 30 tablet; Refill: 1    Mild persistent asthma without complication  Continues on Symbicort twice daily    Tobacco abuse  Unfortunately still smoking cigarettes and we discussed cessation.  He is not ready to quit at this time.    Encounter for therapeutic drug monitoring  We will check electrolytes and renal function while on diuretic  - Basic Metabolic Panel  - Magnesium    We also clarified his CODE STATUS.  Records from [facility] indicate that he wishes to be DNR/DNI.  He in fact does not wish that and prefers full code but would not want to be maintained on life support or tube feeding should he be in a state where he is not expected to recover.  It sounds like he has completed an advanced directive.  We will leave his current status as full code.        40 minutes spent on the date of the encounter doing chart review, history and exam, documentation and further activities as noted above  {Provider  Link to [facility] Help Grid :551641}     Return in about 4 months (around 7/9/2021) for Annual physical.    Aguila Harrison MD  [facility]    Subjective       HPI 55-year-old male with history of dilated cardiomyopathy, chronic systolic congestive heart failure, nonocclusive coronary artery disease, hepatitis C, chronic tobacco abuse, asthma, valvular heart disease, hypercholesterolemia and polysubstance abuse.  Hospitalized on December 31 with acute on chronic systolic congestive heart failure.  Noncompliant with sodium restriction eating frozen dinners daily with 20 pound weight gain.  Responded to diuresis with IV Lasix.  [facility] and was prescribed Ativan for generalized  "anxiety.  He is asking for refill.  See assessment and plan for details of visit    Current Outpatient Medications on File Prior to Visit   Medication Sig Dispense Refill     albuterol (PROAIR HFA;PROVENTIL HFA;VENTOLIN HFA) 90 mcg/actuation inhaler Inhale 2 puffs every 6 (six) hours as needed for wheezing. 1 Inhaler 11     aspirin 81 mg chewable tablet Chew 81 mg.       atorvastatin (LIPITOR) 40 MG tablet TAKE 1 TAB BY MOUTH DAILY AT BEDTIME. 90 tablet 3     budesonide-formoterol (SYMBICORT) 160-4.5 mcg/actuation inhaler Inhale 1 puff 2 (two) times a day. 1 Inhaler 11     EPINEPHrine (EPIPEN/ADRENACLICK/AUVI-Q) 0.3 mg/0.3 mL injection Inject 0.3 mL (0.3 mg total) as directed as needed for anaphylaxis. Inject into thigh. 2 Pre-filled Pen Syringe 3     [DISCONTINUED] furosemide (LASIX) 20 MG tablet Take 1 tablet (20 mg total) by mouth every other day. 45 tablet 3     [DISCONTINUED] lisinopriL (PRINIVIL,ZESTRIL) 5 MG tablet Take 1 tablet (5 mg total) by mouth daily. 90 tablet 3     [DISCONTINUED] LORazepam (ATIVAN) 0.5 MG tablet Take 0.5 mg by mouth daily.       [DISCONTINUED] metoprolol succinate (TOPROL XL) 25 MG Take 1 tablet (25 mg total) by mouth daily. 90 tablet 3     No current facility-administered medications on file prior to visit.         Review of Systems  Denies chest pain.  No worsening shortness of breath.  No edema.  12 point ROS is negative other than what is described in assessment and plan and above      Objective    Vitals:    03/09/21 1512   BP: 94/60   Patient Site: Right Arm   Patient Position: Sitting   Cuff Size: Adult Regular   Pulse: (!) 102   Temp: 97.8  F (36.6  C)   TempSrc: Temporal   SpO2: 97%   Weight: 143 lb (64.9 kg)   Height: 5' 9\" (1.753 m)        Physical Exam  Lungs clear bilaterally without rales or wheezes  Heart regular rate and rhythm  No peripheral edema        "

## 2021-06-16 PROBLEM — Z91.030 BEE STING ALLERGY: Status: ACTIVE | Noted: 2018-10-15

## 2021-06-16 PROBLEM — I25.10 NON-OCCLUSIVE CORONARY ARTERY DISEASE: Status: ACTIVE | Noted: 2017-05-31

## 2021-06-16 PROBLEM — J45.30 MILD PERSISTENT ASTHMA WITHOUT COMPLICATION: Status: ACTIVE | Noted: 2018-10-08

## 2021-06-16 PROBLEM — R00.2 PALPITATIONS: Status: ACTIVE | Noted: 2018-07-27

## 2021-06-16 PROBLEM — R39.11 URINARY HESITANCY: Status: ACTIVE | Noted: 2019-01-18

## 2021-06-16 PROBLEM — Z95.810 S/P ICD (INTERNAL CARDIAC DEFIBRILLATOR) PROCEDURE: Status: ACTIVE | Noted: 2018-10-15

## 2021-06-17 NOTE — TELEPHONE ENCOUNTER
Refill Approved    Rx renewed per Medication Renewal Policy. Medication was last renewed on 3/2/20.    [place], [facility] Triage/Med Refill 5/4/2021     Requested Prescriptions   Pending Prescriptions Disp Refills     atorvastatin (LIPITOR) 40 MG tablet 90 tablet 3     Sig: TAKE 1 TAB BY MOUTH DAILY AT BEDTIME.       Statins Refill Protocol (Hmg CoA Reductase Inhibitors) Passed - 5/3/2021  6:12 PM        Passed - PCP or prescribing provider visit in past 12 months      Last office visit with prescriber/PCP: 3/9/2021 Aguila Harrison MD OR same dept: 3/9/2021 Aguila Harrison MD OR same specialty: 3/9/2021 Aguila Harrison MD  Last physical: 3/28/2019 Last MTM visit: Visit date not found   Next visit within 3 mo: Visit date not found  Next physical within 3 mo: Visit date not found  Prescriber OR PCP: Aguila Harrison MD  Last diagnosis associated with med order: 1. Hyperlipidemia, unspecified hyperlipidemia type  - atorvastatin (LIPITOR) 40 MG tablet; TAKE 1 TAB BY MOUTH DAILY AT BEDTIME.  Dispense: 90 tablet; Refill: 3    If protocol passes may refill for 12 months if within 3 months of last provider visit (or a total of 15 months).

## 2021-06-17 NOTE — TELEPHONE ENCOUNTER
Refill Request  Medication name: atorvastatin (LIPITOR) 40 MG tablet  Requested Prescriptions     Pending Prescriptions Disp Refills     atorvastatin (LIPITOR) 40 MG tablet 90 tablet 3     Sig: TAKE 1 TAB BY MOUTH DAILY AT BEDTIME.     Who prescribed the medication: PCP  Last refill on medication: 3/2/2020  [facility]ed [facility]  Last appointment with PCP: 3/9/21  Next appointment: Not due    Chelsy Weaver, RT (R)

## 2021-06-18 NOTE — LETTER
Letter by Aguila Harrison MD at      Author: Aguila Harrison MD Service: -- Author Type: --    Filed:  Encounter Date: 1/19/2019 Status: (Other)       Fan Villagomez  [address] [place] [address]             January 19, 2019         Dear Fermin,    Below are the results from your recent visit:    Resulted Orders   Basic Metabolic Panel   Result Value Ref Range    Sodium 139 136 - 145 mmol/L    Potassium 4.2 3.5 - 5.0 mmol/L    Chloride 105 98 - 107 mmol/L    CO2 25 22 - 31 mmol/L    Anion Gap, Calculation 9 5 - 18 mmol/L    Glucose 90 70 - 125 mg/dL    Calcium 8.9 8.5 - 10.5 mg/dL    BUN 15 8 - 22 mg/dL    Creatinine 0.90 0.70 - 1.30 mg/dL    GFR MDRD Af Amer >60 >60 mL/min/1.73m2    GFR MDRD Non Af Amer >60 >60 mL/min/1.73m2    Narrative    Fasting Glucose reference range is 70-99 mg/dL per  American Diabetes Association (ADA) guidelines.   Glycosylated Hemoglobin A1c   Result Value Ref Range    Hemoglobin A1c 5.4 3.5 - 6.0 %   Drug Abuse 1+, Urine   Result Value Ref Range    Amphetamines (!) Screen Negative     Screen Positive (Confirmation available on request)    Benzodiazepines Screen Negative Screen Negative    Opiates Screen Negative Screen Negative    Phencyclidine Screen Negative Screen Negative    THC (!) Screen Negative     Screen Positive (Confirmation available on request)    Barbiturates Screen Negative Screen Negative    Cocaine Metabolite Screen Negative Screen Negative    Methadone Screen Negative Screen Negative    Oxycodone Screen Negative Screen Negative    Creatinine, Urine 173.5 mg/dL    Narrative    Drug                           Screening Threshold    Amphetamines                    1000 ng/mL  Benzodiazepine                   200 ng/mL  Opiates                          300 ng/mL  Phencyclidine                     25 ng/mL  THC Metabolite                    50 ng/mL  Barbiturates                     200 ng/mL  Cocaine Metabolite               150 ng/mL  Methadone                         300 ng/mL  Oxycodone                        100 ng/mL    Screening results are to be used only for medical purposes.  Unconfirmed screening results are not to be used for non-  medical purposes.   Hemoglobin   Result Value Ref Range    Hemoglobin 13.9 (L) 14.0 - 18.0 g/dL   Hepatic Profile   Result Value Ref Range    Bilirubin, Total 1.0 0.0 - 1.0 mg/dL    Bilirubin, Direct 0.3 <=0.5 mg/dL    Protein, Total 6.6 6.0 - 8.0 g/dL    Albumin 3.5 3.5 - 5.0 g/dL    Alkaline Phosphatase 95 45 - 120 U/L    AST 28 0 - 40 U/L    ALT 32 0 - 45 U/L       Kidney and liver studies look good and prediabetes is stable.    I am concerned that amphetamines are again positive on your urine drug screen.  Would you be interested in referral back to chemical dependency?  Please call me and I can help arrange.    Please call with questions or contact us using [employer].    Sincerely,        Electronically signed by Aguila Harrison MD

## 2021-06-18 NOTE — LETTER
Letter by Aguila Harrison MD at      Author: Aguila Harrison MD Service: -- Author Type: --    Filed:  Encounter Date: 1/21/2019 Status: (Other)         January 21, 2019      Patient: Fan Villagomez   YOB: 1965   Date of Visit: 01/18/2019       To Whom It May Concern:    Fan Villagomez has been diagnosed with chronic systolic congestive heart failure secondary to dilated cardiomyopathy.  His symptoms include chronic shortness of breath, weakness, easy fatigue, decreased exercise tolerance, and dizziness.  This is a serious and life-threatening condition.    As a result, he has been disabled and has not been able to work since May, 2018.  Despite current treatment, I do not see that he will be able to return to work in the foreseeable future and will remain disabled through December, 2019.    He will be reassessed annually to determine future ability to return to employment.    Sincerely,        Electronically signed by Aguila Harrison MD

## 2021-06-19 NOTE — PROGRESS NOTES
Pulmonary Clinic Follow Up    Cc: follow up asthma    HPI: unfortunate 52yoM with history of remote substance abuse (meth 20 years ago) who presents for follow up after over a year's absence.    He was hospitalized 5/2017 with worsening dyspnea and was found to have an EF that went from 40%-->20-25%. He underwent catheterization but didn't require stenting. There does not seem to be a good reason for the decline in his EF. He has been following up with Dr. Marsh of [facility]'s cardiology since then.    He had significant issues with palpitations while at work. He has a physical job as a  at [employer] and has worked there for a long time. He was having such severe palpitations that he was getting light headed and tachypneic and had to stop to catch his breath. It was bad enough that he lost his job. Since not working, he has not had another episode. Last episode was 2 weeks ago. Unfortuantely he didn't call cardiology to notify them of the symptoms.    He denies any additional substance abuse (Marijuana occasionally but no ETOH or other drugs in decades).     Weight is down to 149 (was 220pounds 5 years ago). He is down 7 pounds from 1 year ago as well.     His breathing is also rough at times. He is rationing his Symbicort so uses it on bad days. Hasn't smoked in over a year as well.     One piece of good news: Now a grandfather, son had a son so he now has a grandson.     Vitals:    07/27/18 1620   BP: 106/60   Pulse: 87   Resp: 18   SpO2: 97%   RA  Gen: thin, disheveled but no distress  HEENT: No lymphadenopathy, +Temporal wasting  C/V: RRR, S1 and S2  Resp: clear bilaterally with good air entry, no wheezing  Ext: no edema, no clubbing  Skin: no rashes  Neuro: mild tremor of the hands.    EKG: Personally reviewed, sinus, normal axis, normal intervals. Jpoint elevation in V2 and V3. V4-6 inversion of [place]. Borderline LVH.    ASSESSMENT/PLAN:  Asthma-mild intermittent  -Symbicort if we can get it  covered based on income with [employer]    Palpitations:  EKG today--no abnormalities but concerned about arrhythmia risk due to his EF of 20%. He tells me he did have a monitor previously but didn't show anything.  Attempted to make an appointment with cardiology clinic but they turned phones off at 5pm so I was unable to. I did get the appropriate number for patient to call and attempt to get in sooner than 1 month from now.    Tobacco abuse  In remission, doesn't qualify for screening as he is too young (<55).    Follow up 6 weeks, make sure things still going ok.  He needs a PCP. He didn't like the one he met in 2018. I encouraged him to keep looking because someone needs to keep coordinating his care.   >50% of this 30 minute visit spent in direct patient counseling.  Rica Neumann MD  Electronically signed on 7/27/2018 5:32 PM

## 2021-06-19 NOTE — LETTER
Letter by Aguila Harrison MD at      Author: Aguila Harrison MD Service: -- Author Type: --    Filed:  Encounter Date: 3/29/2019 Status: (Other)         Fan Villagomez  [address] [place] [address]             March 29, 2019         Dear Fermin,    Below are the results from your recent visit:    Resulted Orders   Basic Metabolic Panel   Result Value Ref Range    Sodium 138 136 - 145 mmol/L    Potassium 4.1 3.5 - 5.0 mmol/L    Chloride 105 98 - 107 mmol/L    CO2 22 22 - 31 mmol/L    Anion Gap, Calculation 11 5 - 18 mmol/L    Glucose 119 70 - 125 mg/dL    Calcium 9.7 8.5 - 10.5 mg/dL    BUN 23 (H) 8 - 22 mg/dL    Creatinine 1.08 0.70 - 1.30 mg/dL    GFR MDRD Af Amer >60 >60 mL/min/1.73m2    GFR MDRD Non Af Amer >60 >60 mL/min/1.73m2    Narrative    Fasting Glucose reference range is 70-99 mg/dL per  American Diabetes Association (ADA) guidelines.   Hemoglobin   Result Value Ref Range    Hemoglobin 15.1 14.0 - 18.0 g/dL   Glycosylated Hemoglobin A1c   Result Value Ref Range    Hemoglobin A1c 5.2 3.5 - 6.0 %       Your labs look good.    Please call with questions or contact us using [facility].    Sincerely,        Electronically signed by Aguila Harrison MD

## 2021-06-20 NOTE — PROGRESS NOTES
Pulmonary Clinic Follow Up    Cc: follow up asthma    HPI: unfortunate 52yoM with history of remote substance abuse (meth 20 years ago) who presents for follow up after over a year's absence.    He was hospitalized 5/2017 with worsening dyspnea and was found to have an EF that went from 40%-->20-25%. He underwent catheterization but didn't require stenting. There does not seem to be a good reason for the decline in his EF. He has been following up with Dr. Marsh of [facility] cardiology since then but can't even get an appointment scheduled since may despite multiple attempts and an in person visit.     ACT:4+1+4+3+3=16    Palpitations are much better, less stress. Not on disability, wanted light duty, but can't figure it out.     ROS: Notable for night sweats, visual disturbance, irregular heart beat, shortness of breath, weakness, unable to  heavy things like he used to.     Vitals:    09/17/18 1423   BP: 100/64   Pulse: (!) 116   Resp: 28   SpO2: 98%   RA  Gen: thin, but no distress  HEENT: No lymphadenopathy, +Temporal wasting  C/V: tachycardic, S1 and S2  Resp: clear bilaterally with good air entry, no wheezing  Ext: no edema, no clubbing  Skin: no rashes  Neuro: mild tremor of the hands.    EKG: Sinus tach, rate >100, ST inversions in V4-->V6 that are new. Persistent Jpoint elevations in V1-->V3.    ASSESSMENT/PLAN:  Asthma-mild intermittent  -Symbicort as he can fill it    Palpitations  Non-ischemic cardiomyopathy, EF 20%  -Referral to [facility] cardiology    Tobacco abuse  In remission, still not smoking cigarettes. Congratulations provided. Recommended stopping smoking of any substance.    Imparted to the patient the importance of obtaining a PCP. He needs someone to coordinate his hepatitis C, new cardiomyopathy, asthma and health maintenance. Will try sending him back to [facility] clinic as it is most convenient for him    >50% of this 30 minute visit spent in direct patient  counseling.  Rica Neumann MD  Electronically signed on 9/17/2018 5:32 PM

## 2021-06-21 NOTE — PROGRESS NOTES
Office Visit - Follow Up   Fan Villagomez   52 y.o. male    Date of Visit: 10/15/2018    Chief Complaint   Patient presents with     Establish Care        Assessment and Plan   1. Dilated cardiomyopathy (H)  Chronic systolic congestive heart failure with EF of 10-15% secondary to dilated cardiomyopathy.  Followed by cardiology.  On combination of lisinopril, metoprolol, and furosemide.  - lisinopril (ZESTRIL) 2.5 MG tablet; Take 1 tablet (2.5 mg total) by mouth 2 (two) times a day.  Dispense: 60 tablet; Refill: 5    As a result of his diagnosis, he has been unable to maintain any employment for the past several months since May 2018 and I do not see him being able to return to work in the foreseeable future.  Symptoms include fatigue, decreased exercise tolerance, dizziness, and dyspnea.    2. S/P ICD (internal cardiac defibrillator) procedure  Placement of cardiac defibrillator last week for EF of 10-15%    3. Mild persistent asthma without complication  Using Symbicort daily and albuterol as needed.  Followed by pulmonary    4. Chronic hepatitis C without hepatic coma (H)  I will need to review records regarding previous workup of chronic hepatitis C.  It sounds like he has declined treatment.  Normal LFTs last week during hospitalization.    5. Drug abuse in remission  Former meth user, sober for the past 2 years.  Admits to using THC once or twice weekly and recent urine drug test positive for THC but negative for other substances    6. Hyperlipidemia  Coronary angiogram showing moderate coronary artery disease but no significant stenosis.  Continues atorvastatin daily.  Using aspirin daily    7. Bee sting allergy  We will get him an EpiPen    Return in about 4 weeks (around 11/12/2018) for Recheck.     History of Present Illness   This 52 y.o. old male with complicated medical history returns to the office to establish care with me as his PCP.  Was seen once before here in 2015 by Dr. Kings Colin.  He is  followed closely by both cardiology and pulmonary.  He has chronic asthma using Symbicort daily and albuterol as needed.  Significant cardiac history including dilated cardiomyopathy.  History of substance abuse including methamphetamines but sober for the past 2 years.  He does use THC once or twice per week.  Echo showing EF of 10-15% with severe mitral regurgitation.  Underwent placement of cardiac defibrillator at [facility] last week.  Medical history also significant for chronic hepatitis C.  It does not sound like he has undergone any treatment because of cost.  Denies abdominal pain.  Recent LFTs normal.  He is not interested in addressing at this time.  He has had nonobstructive coronary artery disease seen on coronary angiogram does take a statin and aspirin daily.  Uses metoprolol, lisinopril, and furosemide to manage his CHF.  Quit using tobacco 1 year ago.  Was smoking 1/2-1 pack/day.  He has not been able to work for the past several months since May 2018 because of his CHF symptoms including dyspnea, dizziness, weakness, and decreased exercise tolerance.  He is asking for a letter to pursue disability.    Review of Systems:  Otherwise, a comprehensive review of systems was negative except as noted.     Medications, Allergies and Problem List   Patient Active Problem List   Diagnosis     Hepatitis C     Tobacco abuse     Drug abuse in remission     Palpitations     Cardiomyopathy (H)     Hyperlipidemia     Mild persistent asthma without complication     Non-occlusive coronary artery disease     S/P ICD (internal cardiac defibrillator) procedure     Bee sting allergy       He has a past surgical history that includes Hand surgery (Right); Inguinal hernia repair (Right); and Foot surgery (Left).    Allergies   Allergen Reactions     No Known Drug Allergies        Current Outpatient Prescriptions   Medication Sig Dispense Refill     albuterol (PROAIR HFA;PROVENTIL HFA;VENTOLIN HFA) 90 mcg/actuation  "inhaler Inhale 2 puffs every 6 (six) hours as needed for wheezing. 1 Inhaler 11     aspirin 81 mg chewable tablet Chew 81 mg.       atorvastatin (LIPITOR) 40 MG tablet TAKE 1 TAB BY MOUTH DAILY AT BEDTIME.       budesonide-formoterol (SYMBICORT) 160-4.5 mcg/actuation inhaler Inhale 1 puff 2 (two) times a day. 1 Inhaler 11     furosemide (LASIX) 20 MG tablet Take 20 mg by mouth.       lisinopril (ZESTRIL) 2.5 MG tablet Take 1 tablet (2.5 mg total) by mouth 2 (two) times a day. 60 tablet 5     metoprolol succinate (TOPROL-XL) 50 MG 24 hr tablet Take 50 mg by mouth.       EPINEPHrine (EPIPEN/ADRENACLICK/AUVI-Q) 0.3 mg/0.3 mL injection Inject 0.3 mL (0.3 mg total) as directed as needed for anaphylaxis. Inject into thigh. 2 Pre-filled Pen Syringe 3     No current facility-administered medications for this visit.         Physical Exam   General Appearance:   Stable appearing middle-aged male    /52 (Patient Site: Left Arm, Patient Position: Sitting, Cuff Size: Adult Large)  Pulse 85  Ht 5' 9\" (1.753 m)  Wt 147 lb (66.7 kg)  SpO2 95%  BMI 21.71 kg/m2    HEENT: Normal  Neck without lymphadenopathy  Respiratory: Normal respiratory effort.  Lungs are clear with no rales or wheezes.  Heart: Regular rate and rhythm without murmurs, rubs, or gallops.  No carotid bruits.  Abdomen: Abdomen is soft, nontender without guarding, rebound, masses, or hepatosplenomegaly.  Extremities: No peripheral edema.  Neurologic: Grossly nonfocal  Skin: No cyanosis or pallor  Psych: Alert and oriented ×3, mood appropriate         Additional Information   Social History   Substance Use Topics     Smoking status: Former Smoker     Packs/day: 1.00     Years: 39.00     Types: Cigarettes     Quit date: 11/1/2016     Smokeless tobacco: Never Used     Alcohol use Yes      Comment: rare         Review and/or order of clinical lab tests: Reviewed labs from recent hospitalization October 9-12 including normal LFTs and renal function  Review and/or " order of radiology tests: Reviewed chest x-ray report from October 8, 2018, normal without infiltrates or other abnormality  Review and/or order of medicine tests: Reviewed echo from October 2018 showing EF of 10-15% with severe mitral regurgitation    Review and summarization of old records and/or obtaining history from someone other than the patient and.or discussion of case with another health care provider: Reviewed extensive records including recent hospitalization for placement of cardiac defibrillator last week.  Also reviewed recent pulmonary and cardiology consults for treatment of chronic asthma and of dilated cardiomyopathy with EF of 15% and severe mitral regurgitation.      Time: total time spent with the patient was 40 minutes of which >50% was spent in counseling and coordination of care     Aguila Harrison MD

## 2021-06-21 NOTE — LETTER
Letter by Aguila Harrison MD at      Author: Aguila Harrison MD Service: -- Author Type: --    Filed:  Encounter Date: 3/10/2021 Status: (Other)         Fan Villagomez  [address] [place] [address]             March 10, 2021         Dear Fermin,    Below are the results from your recent visit:    Resulted Orders   Basic Metabolic Panel   Result Value Ref Range    Sodium 138 136 - 145 mmol/L    Potassium 4.3 3.5 - 5.0 mmol/L    Chloride 106 98 - 107 mmol/L    CO2 23 22 - 31 mmol/L    Anion Gap, Calculation 9 5 - 18 mmol/L    Glucose 102 70 - 125 mg/dL    Calcium 8.3 (L) 8.5 - 10.5 mg/dL    BUN 22 8 - 22 mg/dL    Creatinine 0.98 0.70 - 1.30 mg/dL    GFR MDRD Af Amer >60 >60 mL/min/1.73m2    GFR MDRD Non Af Amer >60 >60 mL/min/1.73m2    Narrative    Fasting Glucose reference range is 70-99 mg/dL per  American Diabetes Association (ADA) guidelines.   Magnesium   Result Value Ref Range    Magnesium 1.8 1.8 - 2.6 mg/dL       Kidney function and electrolytes look okay.    Please call with questions or contact us using [employer].    Sincerely,    Electronically signed by Aguila Harrison MD

## 2021-06-23 NOTE — TELEPHONE ENCOUNTER
Patient called said has new Pharmacy, wants meds that  ordered yesterday sent to [facility] instead. (Lasix and Toprol).

## 2021-06-23 NOTE — PROGRESS NOTES
Office Visit - Follow Up   Fan Villagomez   53 y.o. male    Date of Visit: 1/18/2019    Chief Complaint   Patient presents with     Paperwork     for Benefits        Assessment and Plan   1. Chronic systolic (congestive) heart failure (H) secondary to dilated cardiomyopathy  Recent echo with EF unchanged at 10-15%.  There is symptomatic interfering with his ability to continue to work.  Was experiencing dyspnea, dizziness and fatigue whenever trying to perform usual work responsibilities.  He is not able to perform any significant work at this time.  Forms are completed and letter is written.  He will continue to follow-up with cardiology.  We need to clarify his medications whether he is on 10 mg of lisinopril or still on 2.5 mg twice daily.  Toprol-XL 25 mg versus 50 mg and Lasix every other day at 20 mg.  ICD has been placed    2. Non-occlusive coronary artery disease  Continue current medical management including aspirin and statin along with metoprolol    3. Hyperlipidemia, unspecified hyperlipidemia type  Remains on atorvastatin we will recheck lipids when he returns fasting    4. Prediabetes  Continue to monitor A1c  - Glycosylated Hemoglobin A1c    5. Chronic hepatitis C without hepatic coma (H)  Recheck LFTs.  He should follow-up with gastroenterology and will review records  - Hepatic Profile    6. Mild persistent asthma without complication  Stable using Symbicort    7. Polysubstance abuse (H)  We will recheck urine drug screen.  He is adamant that he has not used any methamphetamine for the past 2 years but continues to smoke marijuana on a regular basis  - Drug Abuse 1+, Urine    8. Tobacco abuse  Quit smoking 1 year ago    9. Screening for prostate cancer    - PSA, Annual Screen (Prostatic-Specific Antigen); Future    10. Medication monitoring encounter  Monitor electrolytes  - Basic Metabolic Panel  - Hemoglobin    11. Urinary hesitancy  Declines having a prostate exam.  Probable BPH.  No  obvious side effect of medication.  Will try to get a PSA drawn.    Return in about 4 months (around 5/18/2019) for Recheck.     History of Present Illness   This 53 y.o. old male with dilated cardiomyopathy and nonocclusive coronary artery disease.  Known EF of only 10-15%.  Recent echocardiogram confirming persistent decreased left ventricular systolic function.  He has ICD placed.  Cardiology recommended titrating lisinopril up to 10 mg daily and it is unclear if he is doing that or if he is still on 2.5 mg twice daily.  He is not sure on any of his medications.  Sounds like he is taking his Lasix every other day.  He has not been able to return to work.  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  He has ongoing dyspnea but if he gets frequent rest and spaces himself, he is doing reasonably well and is stable.  Denies worsening edema.  No chest pain or palpitations.  No syncopal episodes.  His ICD has not fired.  He describes some occasional urinary hesitancy where it is hard to pass his urine stream.  He declines having a prostate exam today.  Urine drug screen in October was positive for methamphetamines.  He tells me that he does use marijuana and did smoke something that must have been laced with amphetamines.  He is adamant that he has remained without any use of methamphetamine past 2 years.  Still smokes weed pretty regularly.  Quit cigarettes 1 year ago.    Review of Systems:  Otherwise, a comprehensive review of systems was negative except as noted.     Medications, Allergies and Problem List   Patient Active Problem List   Diagnosis     Bee sting allergy     Chronic hepatitis C (H)     Dilated cardiomyopathy (H)     Chronic systolic (congestive) heart failure (H)     Hyperlipidemia     Mild persistent asthma without complication     Non-occlusive coronary artery disease     Palpitations     Polysubstance abuse (H)      "Prediabetes     S/P ICD (internal cardiac defibrillator) procedure     Tobacco abuse     Urinary hesitancy       He has a past surgical history that includes Hand surgery (Right); Inguinal hernia repair (Right); Foot surgery (Left); and Cardiac defibrillator placement (10/2018).    Allergies   Allergen Reactions     No Known Drug Allergies        Current Outpatient Medications   Medication Sig Dispense Refill     albuterol (PROAIR HFA;PROVENTIL HFA;VENTOLIN HFA) 90 mcg/actuation inhaler Inhale 2 puffs every 6 (six) hours as needed for wheezing. 1 Inhaler 11     aspirin 81 mg chewable tablet Chew 81 mg.       atorvastatin (LIPITOR) 40 MG tablet TAKE 1 TAB BY MOUTH DAILY AT BEDTIME.       budesonide-formoterol (SYMBICORT) 160-4.5 mcg/actuation inhaler Inhale 1 puff 2 (two) times a day. 1 Inhaler 11     furosemide (LASIX) 20 MG tablet Take 1 tablet (20 mg total) by mouth every other day. 45 tablet 3     lisinopril (ZESTRIL) 2.5 MG tablet Take 1 tablet (2.5 mg total) by mouth 2 (two) times a day. 60 tablet 5     EPINEPHrine (EPIPEN/ADRENACLICK/AUVI-Q) 0.3 mg/0.3 mL injection Inject 0.3 mL (0.3 mg total) as directed as needed for anaphylaxis. Inject into thigh. 2 Pre-filled Pen Syringe 3     metoprolol succinate (TOPROL XL) 25 MG Take 1 tablet (25 mg total) by mouth daily. 30 tablet 11     No current facility-administered medications for this visit.         Physical Exam   General Appearance:   Anxious appearing middle-aged male    BP 94/60 (Patient Site: Left Arm, Patient Position: Sitting, Cuff Size: Adult Large)   Pulse 92   Ht 5' 9\" (1.753 m)   Wt 154 lb (69.9 kg)   SpO2 98%   BMI 22.74 kg/m      HEENT: Normal  Respiratory: Normal respiratory effort.  Lungs are clear with no rales or wheezes although diminished breath sounds  Heart: Regular rate and rhythm   Extremities: No peripheral edema.  Neurologic: Grossly nonfocal  Skin: No cyanosis or pallor           Additional Information   Social History     Tobacco " Use     Smoking status: Former Smoker     Packs/day: 1.00     Years: 39.00     Pack years: 39.00     Types: Cigarettes     Last attempt to quit: 2016     Years since quittin.2     Smokeless tobacco: Never Used   Substance Use Topics     Alcohol use: Yes     Comment: rare     Drug use: Yes     Types: Methamphetamines, Marijuana     Comment: infrequent, last used meth          Review and/or order of clinical lab tests: Rechecking A1c along with BMP and hemoglobin    Review and/or order of medicine tests: Reviewed echocardiogram  CONCLUSION:    Severely dilated and spherically remodeled LV.  Global hypokinesis     with EF 15%    Normal RV size and function.     Moderate LA dilatation.     Severe function mitral regurgitation.  Dilated annulus with apically     displaced closure point.     Mild tricuspid regurgitation.     Defibrillator lead noted.     IVC mildly dilated with RA mean pressure 10 mm Hg. Estimated RV     peak systolic pressure 39 mm Hg.      Left Ventricular Ejection Fraction: 15 %     Review and summarization of old records and/or obtaining history from someone other than the patient and.or discussion of case with another health care provider: Reviewed outside records including cardiology appointment in 2018.  His lisinopril was being increased to 10 mg.  Continued on Lasix 20 mg every other day.  Echocardiogram obtained.       Aguila Harrison MD

## 2021-06-26 NOTE — PROGRESS NOTES
Progress Notes by Natacha Reid MD at 9/21/2018 10:50 AM     Author: Natacha Reid MD Service: -- Author Type: Physician    Filed: 9/21/2018 12:56 PM Encounter Date: 9/21/2018 Status: Signed    : Natacha Reid MD (Physician)           Click to link to [facility] HEART CARE NOTE    Thank you, Dr. Neumann, for asking us to see Fan Villagomez at the [facility].      Assessment/Recommendations   Assessment:    1.  Dilated cardiomyopathy: Left ventricular ejection fraction reduced 20% on last echocardiogram done a year ago with moderate mitral regurgitation.  Will recommend repeating his echocardiogram.  Continue on Toprol-XL 50 mg a day.  Add lisinopril 2.5 mg a day.  He is a candidate for prophylactic ICD placement and discussed that with him today.  We will await findings on echocardiogram prior to referral.  2.  Chronic systolic congestive heart failure: NYHA class II heart failure.  Currently appears well compensated on Lasix 20 mg daily.  Will check BMP BNP today.  He has not had recent labs.    3.  Coronary artery disease: Moderate coronary artery disease detected on angiogram done last year in May 2017 at [facility].  No anginal complaints.  Continue daily aspirin and Lipitor 40 mg daily.  4.  Counseled on continued tobacco cessation.  Recommend that he stop smoking marijuana.  He has not used methamphetamines for 2 years.   Follow-up in our heart failure clinic for further med titration in 2 weeks.  Follow-up with me in 1 month.       History of Present Illness    Mr. Fan Villagomez is a 52 y.o. male with history of polysubstance abuse last used methamphetamines 2 years ago, dilated nonischemic cardiomyopathy with left ventricular ejection fraction of 20%, moderate mitral regurgitation, history tobacco abuse and current cannabinoid abuse, COPD, chronic systolic congestive heart failure, hep C, nonobstructive coronary  artery disease on angiogram in May 2017 who I am seeing today for initial consultation to establish care.  He is now being followed at [facility].  He was previously being seen at [facility].  He was not always compliant with his follow-up visits.  He was being seen by Dr. Lela Bonner.  He reports that he has been able to quit smoking tobacco but still smokes marijuana on an almost daily basis.  He last used methamphetamines 2 years ago and denies drinking alcohol.  He follows with Dr. Nel Strauss from pulmonary.  He reports that he feels palpitations when he does anything exertional.  This occurs may be every other day.  Denies any lightheadedness.  He complains of shortness of breath with exertion.  He denies any chest pain.      Coronary angiogram 5/10/2017     Indications:       Cardiomyopathy in diseases classified                        elsewhere, Heart failure, unspecified,                        Shortness of breath, Chest pain,                        unspecified         Conclusions        1. Moderate 50% mid LAD stenosis. FFR is 0.85 which is         not flow limiting        2. Moderate 50% OM1 stenosis. FFR is 0.95 which is not         flow limiting          RA mean: 2mmHg        RV: 20/2        PA: 20/9 (mean 15mmHg)        PCWP mean: 7mmHg        LVEDP: 5mmHg        Barry CO: 3.5 L/min        Barry CI: 1.9 L/min/m2      Recommendations        - Continue management of non-ischemic cardiomyopathy.     Echocardiogram 8/2/17  CONCLUSION:    1.  Severely reduced LV systolic function, EF 20%.    2.  Severe LV dilatation.    3.  Normal RV size and function.    4.  Moderate LA dilatation.    5.  Moderate mitral regurgitation.    6.  In comparison with the prior report, the mitral regurgitation has     improved.  LV size and function are unchanged.      Left Ventricular Ejection Fraction: 20 %        Physical Examination Review of Systems   Vitals:    09/21/18 1058   BP: 100/58   Pulse: 70   Resp: 16      Body mass index is 21.56 kg/(m^2).  Wt Readings from Last 3 Encounters:   09/21/18 146 lb (66.2 kg)   09/17/18 147 lb 3.2 oz (66.8 kg)   07/27/18 147 lb (66.7 kg)       General Appearance:   alert, no apparent distress   HEENT:  no scleral icterus; the mucous membranes are pink and moist                                  Neck: jugular venous pressure normal   Chest: the spine is straight and the chest is symmetric   Lungs:   respirations unlabored; the lungs are clear to auscultation   Cardiovascular:   regular rhythm with normal first and second heart sounds and no murmurs or gallops there are no carotid  bruits.   Abdomen:  no organomegaly, masses, bruits, or tenderness; bowel sounds are present   Extremities: no cyanosis, clubbing, or edema   Skin: no xanthelasma    General: Weight Loss  Eyes: Visual Distubance  Ears/Nose/Throat: WNL  Lungs: Shortness of Breath  Heart: Shortness of Breath with activity (Palpitations)  Stomach: WNL  Bladder: Frequent Urination at Night  Muscle/Joints: Muscle Weakness  Skin: WNL  Nervous System: Daytime Sleepiness, Dizziness  Mental Health: WNL     Blood: WNL     Medical History  Surgical History Family History Social History   Past Medical History:   Diagnosis Date   ? Asthma    Severe dilated nonischemic cardiomyopathy with left ventricular ejection fraction of 20%    History of polysubstance abuse including methamphetamines last use 2 years ago    Chronic systolic congestive heart failure Past Surgical History:   Procedure Laterality Date   ? FOOT SURGERY Left      surgery   ? HAND SURGERY Right    ? INGUINAL HERNIA REPAIR Right     Family History   Problem Relation Age of Onset   ? Kidney failure Mother      dialysis   ? Cancer Father      unknown details   ? Diabetes Brother    ? Heart failure Brother    ? No Medical Problems Brother    ? Drug abuse Brother    ? No Medical Problems Brother    ? No Medical Problems Brother     Social History     Social History   ?  Marital status: Single     Spouse name: N/A   ? Number of children: N/A   ? Years of education: N/A     Occupational History   ? Not on file.     Social History Main Topics   ? Smoking status: Former Smoker     Packs/day: 1.00     Years: 39.00     Types: Cigarettes     Quit date: 11/1/2016   ? Smokeless tobacco: Never Used   ? Alcohol use Yes      Comment: rare   ? Drug use: Yes     Special: Methamphetamines, Marijuana      Comment: infrequent, last used meth 2010   ? Sexual activity: Yes     Partners: Female     Other Topics Concern   ? Not on file     Social History Narrative    Lives with his brother, Atul.  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  One son, Don.            Medications  Allergies   Current Outpatient Prescriptions   Medication Sig Dispense Refill   ? albuterol (PROAIR HFA;PROVENTIL HFA;VENTOLIN HFA) 90 mcg/actuation inhaler Inhale 1-2 puffs.     ? aspirin 81 mg chewable tablet Chew 81 mg.     ? atorvastatin (LIPITOR) 40 MG tablet TAKE 1 TAB BY MOUTH DAILY AT BEDTIME.     ? budesonide-formoterol (SYMBICORT) 160-4.5 mcg/actuation inhaler 1 puff.     ? furosemide (LASIX) 20 MG tablet Take 20 mg by mouth.     ? metoprolol succinate (TOPROL-XL) 50 MG 24 hr tablet Take 50 mg by mouth.     ? lisinopril (ZESTRIL) 2.5 MG tablet Take 1 tablet (2.5 mg total) by mouth daily. 30 tablet 5     No current facility-administered medications for this visit.       Allergies   Allergen Reactions   ? Bee Sting Kit    ? No Known Drug Allergies          Lab Results    Chemistry/lipid CBC Cardiac Enzymes/BNP/TSH/INR   Lab Results   Component Value Date    CHOL 198 06/02/2015    HDL 41 06/02/2015    LDLCALC 81 06/02/2015    TRIG 382 (H) 06/02/2015    CREATININE 0.94 12/14/2016    BUN 16 12/14/2016    K 3.9 12/14/2016     12/14/2016     12/14/2016    CO2 25 12/14/2016    Lab Results   Component Value Date    WBC 12.6 (H) 12/14/2016    HGB 14.9 12/14/2016    HCT 45.2 12/14/2016    MCV 95 12/14/2016      12/14/2016    Lab Results   Component Value Date    TROPONINI <0.01 11/10/2014    TSH 1.46 06/02/2015

## 2021-07-05 ENCOUNTER — COMMUNICATION - HEALTHEAST (OUTPATIENT)
Dept: INTERNAL MEDICINE | Facility: CLINIC | Age: 56
End: 2021-07-05

## 2021-07-05 DIAGNOSIS — F41.1 GENERALIZED ANXIETY DISORDER: ICD-10-CM

## 2021-07-05 RX ORDER — ESCITALOPRAM OXALATE 10 MG/1
TABLET ORAL
Qty: 30 TABLET | Refills: 8 | Status: SHIPPED | OUTPATIENT
Start: 2021-07-05 | End: 2021-07-29

## 2021-07-05 NOTE — TELEPHONE ENCOUNTER
Telephone Encounter by Audra James RN at 7/5/2021 11:47 AM     Author: Audra James RN Service: -- Author Type: Registered Nurse    Filed: 7/5/2021 11:49 AM Encounter Date: 7/5/2021 Status: Signed    : Audra James RN (Registered Nurse)       Refill Approved    Rx renewed per Medication Renewal Policy. Medication was last renewed on 3/9/2021.    Audra James, [facility] Triage/Med Refill 7/5/2021     Requested Prescriptions   Pending Prescriptions Disp Refills   ? escitalopram oxalate (LEXAPRO) 10 MG tablet [Pharmacy Med Name: ESCITALOPRAM 10MG TABLETS] 30 tablet 2     Sig: TAKE 1 TABLET(10 MG) BY MOUTH DAILY       SSRI Refill Protocol  Passed - 7/5/2021  9:57 AM        Passed - PCP or prescribing provider visit in last year     Last office visit with prescriber/PCP: 3/9/2021 Aguila Harrison MD OR same dept: 3/9/2021 Aguila Harrison MD OR same specialty: 3/9/2021 Aguila Harrison MD  Last physical: 3/28/2019 Last MTM visit: Visit date not found   Next visit within 3 mo: Visit date not found  Next physical within 3 mo: Visit date not found  Prescriber OR PCP: Aguila Harrison MD  Last diagnosis associated with med order: 1. Generalized anxiety disorder  - escitalopram oxalate (LEXAPRO) 10 MG tablet [Pharmacy Med Name: ESCITALOPRAM 10MG TABLETS]; TAKE 1 TABLET(10 MG) BY MOUTH DAILY  Dispense: 30 tablet; Refill: 2    If protocol passes may refill for 12 months if within 3 months of last provider visit (or a total of 15 months).

## 2021-07-14 PROBLEM — J45.30 MILD PERSISTENT ASTHMA WITHOUT COMPLICATION: Status: RESOLVED | Noted: 2018-10-08 | Resolved: 2018-11-15

## 2021-07-14 PROBLEM — I25.10 NON-OCCLUSIVE CORONARY ARTERY DISEASE: Status: RESOLVED | Noted: 2017-05-31 | Resolved: 2018-11-15

## 2021-07-29 ENCOUNTER — APPOINTMENT (OUTPATIENT)
Dept: CT IMAGING | Facility: CLINIC | Age: 56
DRG: 292 | End: 2021-07-29
Attending: STUDENT IN AN ORGANIZED HEALTH CARE EDUCATION/TRAINING PROGRAM
Payer: COMMERCIAL

## 2021-07-29 ENCOUNTER — HOSPITAL ENCOUNTER (INPATIENT)
Facility: CLINIC | Age: 56
LOS: 2 days | Discharge: HOME | DRG: 292 | End: 2021-07-31
Attending: STUDENT IN AN ORGANIZED HEALTH CARE EDUCATION/TRAINING PROGRAM | Admitting: FAMILY MEDICINE
Payer: COMMERCIAL

## 2021-07-29 ENCOUNTER — OFFICE VISIT (OUTPATIENT)
Dept: INTERNAL MEDICINE | Facility: CLINIC | Age: 56
End: 2021-07-29
Payer: COMMERCIAL

## 2021-07-29 VITALS — SYSTOLIC BLOOD PRESSURE: 100 MMHG | HEART RATE: 93 BPM | DIASTOLIC BLOOD PRESSURE: 77 MMHG | OXYGEN SATURATION: 95 %

## 2021-07-29 DIAGNOSIS — R35.0 URINARY FREQUENCY: ICD-10-CM

## 2021-07-29 DIAGNOSIS — E78.5 HYPERLIPIDEMIA, UNSPECIFIED HYPERLIPIDEMIA TYPE: ICD-10-CM

## 2021-07-29 DIAGNOSIS — R06.09 DYSPNEA ON EXERTION: ICD-10-CM

## 2021-07-29 DIAGNOSIS — I42.0 DILATED CARDIOMYOPATHY (H): ICD-10-CM

## 2021-07-29 DIAGNOSIS — R73.03 PREDIABETES: ICD-10-CM

## 2021-07-29 DIAGNOSIS — J45.30 MILD PERSISTENT ASTHMA WITHOUT COMPLICATION: ICD-10-CM

## 2021-07-29 DIAGNOSIS — Z12.5 SCREENING FOR PROSTATE CANCER: ICD-10-CM

## 2021-07-29 DIAGNOSIS — I50.22 CHRONIC SYSTOLIC (CONGESTIVE) HEART FAILURE (H): Primary | ICD-10-CM

## 2021-07-29 DIAGNOSIS — F41.1 GENERALIZED ANXIETY DISORDER: ICD-10-CM

## 2021-07-29 DIAGNOSIS — R00.2 PALPITATIONS: ICD-10-CM

## 2021-07-29 DIAGNOSIS — E87.70 HYPERVOLEMIA, UNSPECIFIED HYPERVOLEMIA TYPE: ICD-10-CM

## 2021-07-29 PROBLEM — K11.8 PAROTID MASS: Status: RESOLVED | Noted: 2019-04-10 | Resolved: 2021-07-29

## 2021-07-29 PROBLEM — R91.8 ABNORMALITY OF LUNG ON CXR: Status: ACTIVE | Noted: 2020-10-28

## 2021-07-29 PROBLEM — K11.8 MASS OF PAROTID GLAND: Status: ACTIVE | Noted: 2019-03-28

## 2021-07-29 PROBLEM — R91.8 ABNORMALITY OF LUNG ON CXR: Status: RESOLVED | Noted: 2020-10-28 | Resolved: 2021-07-29

## 2021-07-29 PROBLEM — Z45.02 ICD (IMPLANTABLE CARDIOVERTER-DEFIBRILLATOR) DISCHARGE: Status: ACTIVE | Noted: 2020-02-27

## 2021-07-29 PROBLEM — K11.8 PAROTID MASS: Status: ACTIVE | Noted: 2019-04-10

## 2021-07-29 PROBLEM — F15.20 METHAMPHETAMINE USE DISORDER, SEVERE, DEPENDENCE (H): Status: ACTIVE | Noted: 2021-01-01

## 2021-07-29 PROBLEM — I34.0 MITRAL VALVE INSUFFICIENCY: Status: ACTIVE | Noted: 2018-10-24

## 2021-07-29 LAB
AMPHETAMINES UR QL SCN: ABNORMAL
AMPHETAMINES UR QL SCN: NORMAL
ANION GAP SERPL CALCULATED.3IONS-SCNC: 12 MMOL/L (ref 5–18)
BARBITURATES UR QL: ABNORMAL
BARBITURATES UR QL: NORMAL
BASOPHILS # BLD AUTO: 0 10E3/UL (ref 0–0.2)
BASOPHILS NFR BLD AUTO: 0 %
BENZODIAZ UR QL: ABNORMAL
BENZODIAZ UR QL: NORMAL
BNP SERPL-MCNC: >5000 PG/ML (ref 0–46)
BUN SERPL-MCNC: 30 MG/DL (ref 8–22)
CALCIUM SERPL-MCNC: 8.7 MG/DL (ref 8.5–10.5)
CANNABINOIDS UR QL SCN: ABNORMAL
CANNABINOIDS UR QL SCN: NORMAL
CHLORIDE BLD-SCNC: 104 MMOL/L (ref 98–107)
CO2 SERPL-SCNC: 24 MMOL/L (ref 22–31)
COCAINE UR QL: ABNORMAL
COCAINE UR QL: NORMAL
CREAT SERPL-MCNC: 1.13 MG/DL (ref 0.7–1.3)
CREAT UR-MCNC: 13 MG/DL
CREAT UR-MCNC: 7 MG/DL
EOSINOPHIL # BLD AUTO: 0 10E3/UL (ref 0–0.7)
EOSINOPHIL NFR BLD AUTO: 0 %
ERYTHROCYTE [DISTWIDTH] IN BLOOD BY AUTOMATED COUNT: 14.1 % (ref 10–15)
GFR SERPL CREATININE-BSD FRML MDRD: 73 ML/MIN/1.73M2
GLUCOSE BLD-MCNC: 111 MG/DL (ref 70–125)
HCT VFR BLD AUTO: 46.7 % (ref 40–53)
HGB BLD-MCNC: 15.4 G/DL (ref 13.3–17.7)
IMM GRANULOCYTES # BLD: 0.1 10E3/UL
IMM GRANULOCYTES NFR BLD: 0 %
LACTATE SERPL-SCNC: 1.9 MMOL/L (ref 0.7–2)
LYMPHOCYTES # BLD AUTO: 3.5 10E3/UL (ref 0.8–5.3)
LYMPHOCYTES NFR BLD AUTO: 21 %
MAGNESIUM SERPL-MCNC: 1.7 MG/DL (ref 1.8–2.6)
MCH RBC QN AUTO: 32 PG (ref 26.5–33)
MCHC RBC AUTO-ENTMCNC: 33 G/DL (ref 31.5–36.5)
MCV RBC AUTO: 97 FL (ref 78–100)
METHADONE UR QL SCN: ABNORMAL
MONOCYTES # BLD AUTO: 1.5 10E3/UL (ref 0–1.3)
MONOCYTES NFR BLD AUTO: 9 %
NEUTROPHILS # BLD AUTO: 11.5 10E3/UL (ref 1.6–8.3)
NEUTROPHILS NFR BLD AUTO: 70 %
NRBC # BLD AUTO: 0 10E3/UL
NRBC BLD AUTO-RTO: 0 /100
OPIATES UR QL SCN: ABNORMAL
OPIATES UR QL SCN: NORMAL
OXYCODONE UR QL: ABNORMAL
OXYCODONE UR QL: NORMAL
PCP UR QL SCN: ABNORMAL
PCP UR QL SCN: NORMAL
PLATELET # BLD AUTO: 320 10E3/UL (ref 150–450)
POTASSIUM BLD-SCNC: 4.3 MMOL/L (ref 3.5–5)
POTASSIUM BLD-SCNC: 4.8 MMOL/L (ref 3.5–5)
RBC # BLD AUTO: 4.82 10E6/UL (ref 4.4–5.9)
SARS-COV-2 RNA RESP QL NAA+PROBE: NEGATIVE
SODIUM SERPL-SCNC: 140 MMOL/L (ref 136–145)
TROPONIN I SERPL-MCNC: 0.03 NG/ML (ref 0–0.29)
TROPONIN I SERPL-MCNC: 0.04 NG/ML (ref 0–0.29)
WBC # BLD AUTO: 16.6 10E3/UL (ref 4–11)

## 2021-07-29 PROCEDURE — 83735 ASSAY OF MAGNESIUM: CPT | Performed by: FAMILY MEDICINE

## 2021-07-29 PROCEDURE — 99223 1ST HOSP IP/OBS HIGH 75: CPT | Performed by: FAMILY MEDICINE

## 2021-07-29 PROCEDURE — 83880 ASSAY OF NATRIURETIC PEPTIDE: CPT | Performed by: STUDENT IN AN ORGANIZED HEALTH CARE EDUCATION/TRAINING PROGRAM

## 2021-07-29 PROCEDURE — 85025 COMPLETE CBC W/AUTO DIFF WBC: CPT | Performed by: STUDENT IN AN ORGANIZED HEALTH CARE EDUCATION/TRAINING PROGRAM

## 2021-07-29 PROCEDURE — 250N000011 HC RX IP 250 OP 636: Performed by: STUDENT IN AN ORGANIZED HEALTH CARE EDUCATION/TRAINING PROGRAM

## 2021-07-29 PROCEDURE — 93005 ELECTROCARDIOGRAM TRACING: CPT | Performed by: EMERGENCY MEDICINE

## 2021-07-29 PROCEDURE — 99214 OFFICE O/P EST MOD 30 MIN: CPT | Performed by: INTERNAL MEDICINE

## 2021-07-29 PROCEDURE — 80307 DRUG TEST PRSMV CHEM ANLYZR: CPT | Performed by: FAMILY MEDICINE

## 2021-07-29 PROCEDURE — 84484 ASSAY OF TROPONIN QUANT: CPT | Performed by: FAMILY MEDICINE

## 2021-07-29 PROCEDURE — 96374 THER/PROPH/DIAG INJ IV PUSH: CPT

## 2021-07-29 PROCEDURE — 99285 EMERGENCY DEPT VISIT HI MDM: CPT | Mod: 25

## 2021-07-29 PROCEDURE — 80048 BASIC METABOLIC PNL TOTAL CA: CPT | Performed by: STUDENT IN AN ORGANIZED HEALTH CARE EDUCATION/TRAINING PROGRAM

## 2021-07-29 PROCEDURE — 210N000002 HC R&B HEART CARE

## 2021-07-29 PROCEDURE — 250N000013 HC RX MED GY IP 250 OP 250 PS 637: Performed by: FAMILY MEDICINE

## 2021-07-29 PROCEDURE — 87635 SARS-COV-2 COVID-19 AMP PRB: CPT | Performed by: STUDENT IN AN ORGANIZED HEALTH CARE EDUCATION/TRAINING PROGRAM

## 2021-07-29 PROCEDURE — 258N000003 HC RX IP 258 OP 636: Performed by: STUDENT IN AN ORGANIZED HEALTH CARE EDUCATION/TRAINING PROGRAM

## 2021-07-29 PROCEDURE — 250N000013 HC RX MED GY IP 250 OP 250 PS 637: Performed by: STUDENT IN AN ORGANIZED HEALTH CARE EDUCATION/TRAINING PROGRAM

## 2021-07-29 PROCEDURE — 83605 ASSAY OF LACTIC ACID: CPT | Performed by: FAMILY MEDICINE

## 2021-07-29 PROCEDURE — 36415 COLL VENOUS BLD VENIPUNCTURE: CPT | Performed by: STUDENT IN AN ORGANIZED HEALTH CARE EDUCATION/TRAINING PROGRAM

## 2021-07-29 PROCEDURE — 250N000011 HC RX IP 250 OP 636: Performed by: FAMILY MEDICINE

## 2021-07-29 PROCEDURE — 36415 COLL VENOUS BLD VENIPUNCTURE: CPT | Performed by: FAMILY MEDICINE

## 2021-07-29 PROCEDURE — 71275 CT ANGIOGRAPHY CHEST: CPT

## 2021-07-29 PROCEDURE — 96361 HYDRATE IV INFUSION ADD-ON: CPT

## 2021-07-29 PROCEDURE — 80307 DRUG TEST PRSMV CHEM ANLYZR: CPT | Performed by: STUDENT IN AN ORGANIZED HEALTH CARE EDUCATION/TRAINING PROGRAM

## 2021-07-29 PROCEDURE — 84132 ASSAY OF SERUM POTASSIUM: CPT | Performed by: FAMILY MEDICINE

## 2021-07-29 PROCEDURE — 84484 ASSAY OF TROPONIN QUANT: CPT | Mod: 91 | Performed by: STUDENT IN AN ORGANIZED HEALTH CARE EDUCATION/TRAINING PROGRAM

## 2021-07-29 PROCEDURE — C9803 HOPD COVID-19 SPEC COLLECT: HCPCS

## 2021-07-29 RX ORDER — ALBUTEROL SULFATE 90 UG/1
2 AEROSOL, METERED RESPIRATORY (INHALATION) EVERY 6 HOURS PRN
Status: DISCONTINUED | OUTPATIENT
Start: 2021-07-29 | End: 2021-07-31 | Stop reason: HOSPADM

## 2021-07-29 RX ORDER — LISINOPRIL 5 MG/1
5 TABLET ORAL DAILY
Status: DISCONTINUED | OUTPATIENT
Start: 2021-07-29 | End: 2021-07-31 | Stop reason: HOSPADM

## 2021-07-29 RX ORDER — FUROSEMIDE 10 MG/ML
40 INJECTION INTRAMUSCULAR; INTRAVENOUS EVERY 12 HOURS
Status: DISCONTINUED | OUTPATIENT
Start: 2021-07-29 | End: 2021-07-30

## 2021-07-29 RX ORDER — MIRTAZAPINE 7.5 MG/1
7.5 TABLET, FILM COATED ORAL AT BEDTIME
Qty: 90 TABLET | Refills: 3 | Status: SHIPPED | OUTPATIENT
Start: 2021-07-29 | End: 2022-01-01

## 2021-07-29 RX ORDER — ATORVASTATIN CALCIUM 40 MG/1
40 TABLET, FILM COATED ORAL EVERY EVENING
Status: DISCONTINUED | OUTPATIENT
Start: 2021-07-29 | End: 2021-07-31 | Stop reason: HOSPADM

## 2021-07-29 RX ORDER — BUDESONIDE AND FORMOTEROL FUMARATE DIHYDRATE 160; 4.5 UG/1; UG/1
1 AEROSOL RESPIRATORY (INHALATION) 2 TIMES DAILY
Qty: 10.2 G | Refills: 3 | Status: SHIPPED | OUTPATIENT
Start: 2021-07-29 | End: 2022-01-01

## 2021-07-29 RX ORDER — IOPAMIDOL 755 MG/ML
100 INJECTION, SOLUTION INTRAVASCULAR ONCE
Status: COMPLETED | OUTPATIENT
Start: 2021-07-29 | End: 2021-07-29

## 2021-07-29 RX ORDER — LISINOPRIL 5 MG/1
5 TABLET ORAL DAILY
Qty: 90 TABLET | Refills: 3 | Status: SHIPPED | OUTPATIENT
Start: 2021-07-29 | End: 2022-01-01

## 2021-07-29 RX ORDER — MIRTAZAPINE 7.5 MG/1
7.5 TABLET, FILM COATED ORAL AT BEDTIME
Status: DISCONTINUED | OUTPATIENT
Start: 2021-07-29 | End: 2021-07-31 | Stop reason: HOSPADM

## 2021-07-29 RX ORDER — ALBUTEROL SULFATE 90 UG/1
2 AEROSOL, METERED RESPIRATORY (INHALATION) EVERY 6 HOURS PRN
Qty: 18 G | Refills: 3 | Status: SHIPPED | OUTPATIENT
Start: 2021-07-29 | End: 2022-01-01

## 2021-07-29 RX ORDER — BUDESONIDE AND FORMOTEROL FUMARATE DIHYDRATE 160; 4.5 UG/1; UG/1
1 AEROSOL RESPIRATORY (INHALATION) 2 TIMES DAILY
Status: DISCONTINUED | OUTPATIENT
Start: 2021-07-29 | End: 2021-07-31 | Stop reason: HOSPADM

## 2021-07-29 RX ORDER — HYDROXYZINE HYDROCHLORIDE 25 MG/1
25 TABLET, FILM COATED ORAL EVERY 8 HOURS PRN
Qty: 60 TABLET | Refills: 3 | Status: SHIPPED | OUTPATIENT
Start: 2021-07-29 | End: 2022-01-01

## 2021-07-29 RX ORDER — ASPIRIN 81 MG/1
81 TABLET, CHEWABLE ORAL DAILY
Status: DISCONTINUED | OUTPATIENT
Start: 2021-07-30 | End: 2021-07-31 | Stop reason: HOSPADM

## 2021-07-29 RX ORDER — FUROSEMIDE 20 MG
20 TABLET ORAL DAILY
Status: DISCONTINUED | OUTPATIENT
Start: 2021-07-29 | End: 2021-07-30

## 2021-07-29 RX ORDER — ASPIRIN 81 MG/1
162 TABLET, CHEWABLE ORAL ONCE
Status: COMPLETED | OUTPATIENT
Start: 2021-07-29 | End: 2021-07-29

## 2021-07-29 RX ORDER — FUROSEMIDE 10 MG/ML
40 INJECTION INTRAMUSCULAR; INTRAVENOUS ONCE
Status: COMPLETED | OUTPATIENT
Start: 2021-07-29 | End: 2021-07-29

## 2021-07-29 RX ORDER — METOPROLOL SUCCINATE 50 MG/1
50 TABLET, EXTENDED RELEASE ORAL DAILY
Status: DISCONTINUED | OUTPATIENT
Start: 2021-07-29 | End: 2021-07-31 | Stop reason: HOSPADM

## 2021-07-29 RX ORDER — HYDROXYZINE HYDROCHLORIDE 25 MG/1
25 TABLET, FILM COATED ORAL EVERY 8 HOURS PRN
Status: DISCONTINUED | OUTPATIENT
Start: 2021-07-29 | End: 2021-07-31 | Stop reason: HOSPADM

## 2021-07-29 RX ADMIN — ATORVASTATIN CALCIUM 40 MG: 40 TABLET, FILM COATED ORAL at 21:10

## 2021-07-29 RX ADMIN — BUDESONIDE AND FORMOTEROL FUMARATE DIHYDRATE 1 PUFF: 160; 4.5 AEROSOL RESPIRATORY (INHALATION) at 21:14

## 2021-07-29 RX ADMIN — FUROSEMIDE 40 MG: 10 INJECTION, SOLUTION INTRAVENOUS at 21:10

## 2021-07-29 RX ADMIN — FUROSEMIDE 40 MG: 10 INJECTION, SOLUTION INTRAMUSCULAR; INTRAVENOUS at 13:22

## 2021-07-29 RX ADMIN — MIRTAZAPINE 7.5 MG: 7.5 TABLET, FILM COATED ORAL at 21:10

## 2021-07-29 RX ADMIN — IOPAMIDOL 60 ML: 755 INJECTION, SOLUTION INTRAVENOUS at 14:00

## 2021-07-29 RX ADMIN — SODIUM CHLORIDE 500 ML: 9 INJECTION, SOLUTION INTRAVENOUS at 12:12

## 2021-07-29 RX ADMIN — ASPIRIN 81 MG CHEWABLE TABLET 162 MG: 81 TABLET CHEWABLE at 12:12

## 2021-07-29 ASSESSMENT — ASTHMA QUESTIONNAIRES
QUESTION_4 LAST FOUR WEEKS HOW OFTEN HAVE YOU USED YOUR RESCUE INHALER OR NEBULIZER MEDICATION (SUCH AS ALBUTEROL): NOT AT ALL
QUESTION_5 LAST FOUR WEEKS HOW WOULD YOU RATE YOUR ASTHMA CONTROL: NOT CONTROLLED AT ALL
QUESTION_1 LAST FOUR WEEKS HOW MUCH OF THE TIME DID YOUR ASTHMA KEEP YOU FROM GETTING AS MUCH DONE AT WORK, SCHOOL OR AT HOME: MOST OF THE TIME
QUESTION_3 LAST FOUR WEEKS HOW OFTEN DID YOUR ASTHMA SYMPTOMS (WHEEZING, COUGHING, SHORTNESS OF BREATH, CHEST TIGHTNESS OR PAIN) WAKE YOU UP AT NIGHT OR EARLIER THAN USUAL IN THE MORNING: FOUR OR MORE NIGHTS A WEEK
ACT_TOTALSCORE: 10
QUESTION_2 LAST FOUR WEEKS HOW OFTEN HAVE YOU HAD SHORTNESS OF BREATH: MORE THAN ONCE A DAY

## 2021-07-29 ASSESSMENT — ACTIVITIES OF DAILY LIVING (ADL)
DRESSING/BATHING_DIFFICULTY: NO
WALKING_OR_CLIMBING_STAIRS_DIFFICULTY: NO
DIFFICULTY_EATING/SWALLOWING: NO
EQUIPMENT_CURRENTLY_USED_AT_HOME: CANE, QUAD
DEPENDENT_IADLS:: INDEPENDENT
WEAR_GLASSES_OR_BLIND: NO
DOING_ERRANDS_INDEPENDENTLY_DIFFICULTY: NO
DIFFICULTY_COMMUNICATING: NO
HEARING_DIFFICULTY_OR_DEAF: NO
TOILETING_ISSUES: NO
FALL_HISTORY_WITHIN_LAST_SIX_MONTHS: NO
CONCENTRATING,_REMEMBERING_OR_MAKING_DECISIONS_DIFFICULTY: NO

## 2021-07-29 NOTE — PHARMACY-ADMISSION MEDICATION HISTORY
Pharmacy Note - Admission Medication History    Pertinent Provider Information:   Patient requesting refill for both inhalers  Patient was taking Lexapro and stated it was not effective. He was supposed to start mirtazpine tonight. ______________________________________________________________________    Prior To Admission (PTA) med list completed and updated in EMR.       PTA Med List   Medication Sig Note Last Dose     albuterol (PROAIR HFA/PROVENTIL HFA/VENTOLIN HFA) 108 (90 Base) MCG/ACT inhaler Inhale 2 puffs into the lungs every 6 hours as needed for shortness of breath / dyspnea       aspirin 81 mg chewable tablet [ASPIRIN 81 MG CHEWABLE TABLET] Chew 81 mg.  7/29/2021 at in ED     atorvastatin (LIPITOR) 40 MG tablet [ATORVASTATIN (LIPITOR) 40 MG TABLET] TAKE 1 TAB BY MOUTH DAILY AT BEDTIME.  7/28/2021 at Unknown time     budesonide-formoterol (SYMBICORT) 160-4.5 MCG/ACT Inhaler Inhale 1 puff into the lungs 2 times daily       EPINEPHrine (EPIPEN/ADRENACLICK/AUVI-Q) 0.3 mg/0.3 mL injection [EPINEPHRINE (EPIPEN/ADRENACLICK/AUVI-Q) 0.3 MG/0.3 ML INJECTION] Inject 0.3 mL (0.3 mg total) as directed as needed for anaphylaxis. Inject into thigh.       furosemide (LASIX) 20 MG tablet [FUROSEMIDE (LASIX) 20 MG TABLET] Take 1 tablet (20 mg total) by mouth daily.  7/27/2021     hydrOXYzine (ATARAX) 25 MG tablet Take 1 tablet (25 mg) by mouth every 8 hours as needed for anxiety  7/26/2021     lisinopril (ZESTRIL) 5 MG tablet Take 1 tablet (5 mg) by mouth daily  3 months ago     metoprolol succinate (TOPROL XL) 50 MG 24 hr tablet [METOPROLOL SUCCINATE (TOPROL XL) 50 MG 24 HR TABLET] Take 1 tablet (50 mg total) by mouth daily.  7/28/2021 at Unknown time     mirtazapine (REMERON) 7.5 MG tablet Take 1 tablet (7.5 mg) by mouth At Bedtime 7/29/2021: Patient hasn't started taking hasn't started yet       Information source(s): Patient and CareEverywhere/SureScripts  Method of interview communication: in-person    Summary of  Changes to PTA Med List  New: n/a  Discontinued: n/a  Changed: n/a    Patient was asked about OTC/herbal products specifically.  PTA med list reflects this.    In the past week, patient estimated taking medication this percent of the time:  greater than 90%.    Allergies were reviewed, assessed, and updated with the patient.      Patient did not bring any medications to the hospital and can't retrieve from home. No multi-dose medications are available for use during hospital stay.     The information provided in this note is only as accurate as the sources available at the time of the update(s).    Thank you for the opportunity to participate in the care of this patient.    Kirsten Newell  7/29/2021 1:54 PM

## 2021-07-29 NOTE — ED TRIAGE NOTES
"Pt presents to the ED via EMS with c/o palpitations while at the [facility] prior to arrival. Pt has hx of anxiety attack and feels like his heart is racing and he gets SOB. Pt states these \"attacks\" has been going on for \"a while\".   "
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
## 2021-07-29 NOTE — ED NOTES
"Pt started having \"one of the attacks\". Pt appeared pale and sats dropped to 87% and HR was in the 50s. Captured 2nd EKG showing rhythm change with ST changes. Placed pt on 2L NC O2 and sats >92%. MD was notified. Pt then appeared red, diaphoretic, and was tachy on the monitor. This episode lasted roughly 4 minutes.   "

## 2021-07-29 NOTE — CONSULTS
Care Management Initial Consult    General Information  Assessment completed with: Patient,    Type of CM/SW Visit: Initial Assessment    Primary Care Provider verified and updated as needed:     Readmission within the last 30 days:        Reason for Consult: discharge planning  Advance Care Planning:            Communication Assessment  Patient's communication style: spoken language (English or Bilingual)    Hearing Difficulty or Deaf: no   Wear Glasses or Blind: no    Cognitive  Cognitive/Neuro/Behavioral:                        Living Environment:   People in home: friend(s)     Current living Arrangements: house      Able to return to prior arrangements: yes       Family/Social Support:  Care provided by: self  Provides care for: no one                Description of Support System:           Current Resources:   Patient receiving home care services: No     Community Resources: None  Equipment currently used at home: none  Supplies currently used at home: None    Employment/Financial:  Employment Status:          Financial Concerns:             Lifestyle & Psychosocial Needs:  Social Determinants of Health     Tobacco Use: Medium Risk     Smoking Tobacco Use: Former Smoker     Smokeless Tobacco Use: Never Used   Alcohol Use:      Frequency of Alcohol Consumption:      Average Number of Drinks:      Frequency of Binge Drinking:    Financial Resource Strain:      Difficulty of Paying Living Expenses:    Food Insecurity:      Worried About Running Out of Food in the Last Year:      Ran Out of Food in the Last Year:    Transportation Needs:      Lack of Transportation (Medical):      Lack of Transportation (Non-Medical):    Physical Activity:      Days of Exercise per Week:      Minutes of Exercise per Session:    Stress:      Feeling of Stress :    Social Connections:      Frequency of Communication with Friends and Family:      Frequency of Social Gatherings with Friends and Family:      Attends Congregation Services:       Active Member of Clubs or Organizations:      Attends Club or Organization Meetings:      Marital Status:    Intimate Partner Violence:      Fear of Current or Ex-Partner:      Emotionally Abused:      Physically Abused:      Sexually Abused:    Depression: Not at risk     PHQ-2 Score: 0   Housing Stability:      Unable to Pay for Housing in the Last Year:      Number of Places Lived in the Last Year:      Unstable Housing in the Last Year:        Functional Status:  Prior to admission patient needed assistance:   Dependent ADLs:: Independent  Dependent IADLs:: Independent       Mental Health Status:          Chemical Dependency Status:                Values/Beliefs:  Spiritual, Cultural Beliefs, [employer] Practices, Values that affect care:                 Additional Information:  AIDET completed. Pt lives with a freind in a private residence. He is independent with all ADL's, has no services and no DME used.  Anticipate pt will DC back home without needs. Pt will drive self home. Informed that CM will follow progression of care.   SMITH Ignacio      Abbreviation Code:  [facility] care (HC), Patient (Pt), Transitional Care Unit (TCU), Skilled Nursing Facility (SNF), Assisted Living (AL), Independent Living (IL), Physical Therapy (PT), Occupational Therapy (OT)        Radha Mcdonald RN

## 2021-07-30 ENCOUNTER — HOSPITAL ENCOUNTER (INPATIENT)
Dept: CARDIOLOGY | Facility: CLINIC | Age: 56
DRG: 292 | End: 2021-07-30
Attending: FAMILY MEDICINE
Payer: COMMERCIAL

## 2021-07-30 DIAGNOSIS — I50.9 CHF (CONGESTIVE HEART FAILURE) (H): Primary | ICD-10-CM

## 2021-07-30 LAB
ANION GAP SERPL CALCULATED.3IONS-SCNC: 9 MMOL/L (ref 5–18)
BUN SERPL-MCNC: 36 MG/DL (ref 8–22)
CALCIUM SERPL-MCNC: 8.3 MG/DL (ref 8.5–10.5)
CHLORIDE BLD-SCNC: 103 MMOL/L (ref 98–107)
CO2 SERPL-SCNC: 29 MMOL/L (ref 22–31)
CREAT SERPL-MCNC: 1.2 MG/DL (ref 0.7–1.3)
GFR SERPL CREATININE-BSD FRML MDRD: 68 ML/MIN/1.73M2
GLUCOSE BLD-MCNC: 96 MG/DL (ref 70–125)
LVEF ECHO: NORMAL
MAGNESIUM SERPL-MCNC: 2 MG/DL (ref 1.8–2.6)
POTASSIUM BLD-SCNC: 4.1 MMOL/L (ref 3.5–5)
SODIUM SERPL-SCNC: 141 MMOL/L (ref 136–145)
TROPONIN I SERPL-MCNC: 0.04 NG/ML (ref 0–0.29)

## 2021-07-30 PROCEDURE — 255N000002 HC RX 255 OP 636: Performed by: FAMILY MEDICINE

## 2021-07-30 PROCEDURE — 210N000002 HC R&B HEART CARE

## 2021-07-30 PROCEDURE — 99232 SBSQ HOSP IP/OBS MODERATE 35: CPT | Performed by: FAMILY MEDICINE

## 2021-07-30 PROCEDURE — 93306 TTE W/DOPPLER COMPLETE: CPT | Mod: 26 | Performed by: INTERNAL MEDICINE

## 2021-07-30 PROCEDURE — 999N000208 ECHOCARDIOGRAM COMPLETE

## 2021-07-30 PROCEDURE — 80048 BASIC METABOLIC PNL TOTAL CA: CPT | Performed by: FAMILY MEDICINE

## 2021-07-30 PROCEDURE — 83735 ASSAY OF MAGNESIUM: CPT | Performed by: FAMILY MEDICINE

## 2021-07-30 PROCEDURE — 99223 1ST HOSP IP/OBS HIGH 75: CPT | Performed by: INTERNAL MEDICINE

## 2021-07-30 PROCEDURE — 36415 COLL VENOUS BLD VENIPUNCTURE: CPT | Performed by: FAMILY MEDICINE

## 2021-07-30 PROCEDURE — 84484 ASSAY OF TROPONIN QUANT: CPT | Performed by: FAMILY MEDICINE

## 2021-07-30 PROCEDURE — 250N000013 HC RX MED GY IP 250 OP 250 PS 637: Performed by: FAMILY MEDICINE

## 2021-07-30 PROCEDURE — 250N000011 HC RX IP 250 OP 636: Performed by: FAMILY MEDICINE

## 2021-07-30 RX ORDER — FUROSEMIDE 40 MG
40 TABLET ORAL
Status: DISCONTINUED | OUTPATIENT
Start: 2021-07-30 | End: 2021-07-31 | Stop reason: HOSPADM

## 2021-07-30 RX ADMIN — METOPROLOL SUCCINATE 50 MG: 50 TABLET, EXTENDED RELEASE ORAL at 09:44

## 2021-07-30 RX ADMIN — FUROSEMIDE 40 MG: 40 TABLET ORAL at 17:29

## 2021-07-30 RX ADMIN — FUROSEMIDE 40 MG: 10 INJECTION, SOLUTION INTRAVENOUS at 09:45

## 2021-07-30 RX ADMIN — ATORVASTATIN CALCIUM 40 MG: 40 TABLET, FILM COATED ORAL at 20:40

## 2021-07-30 RX ADMIN — BUDESONIDE AND FORMOTEROL FUMARATE DIHYDRATE 1 PUFF: 160; 4.5 AEROSOL RESPIRATORY (INHALATION) at 09:36

## 2021-07-30 RX ADMIN — ASPIRIN 81 MG CHEWABLE TABLET 81 MG: 81 TABLET CHEWABLE at 09:36

## 2021-07-30 RX ADMIN — PERFLUTREN 3 ML: 6.52 INJECTION, SUSPENSION INTRAVENOUS at 08:50

## 2021-07-30 RX ADMIN — BUDESONIDE AND FORMOTEROL FUMARATE DIHYDRATE 1 PUFF: 160; 4.5 AEROSOL RESPIRATORY (INHALATION) at 20:40

## 2021-07-30 RX ADMIN — MIRTAZAPINE 7.5 MG: 7.5 TABLET, FILM COATED ORAL at 20:40

## 2021-07-30 ASSESSMENT — ASTHMA QUESTIONNAIRES: ACT_TOTALSCORE: 10

## 2021-07-30 ASSESSMENT — MIFFLIN-ST. JEOR: SCORE: 1446.35

## 2021-07-30 NOTE — PLAN OF CARE
Problem: Adult Inpatient Plan of Care  Goal: Absence of Hospital-Acquired Illness or Injury  Outcome: Improving  Intervention: Identify and Manage Fall Risk  Recent Flowsheet Documentation  Taken 7/30/2021 0000 by Anu Ortega, RN  Safety Promotion/Fall Prevention:    activity supervised    assistive device/personal items within reach    bed alarm on    clutter free environment maintained    fall prevention program maintained    Intervention: Prevent Skin Injury  Recent Flowsheet Documentation  Taken 7/30/2021 0000 by Anu Ortega, RN  Body Position: position changed independently       Problem: Dysrhythmia (Heart Failure)  Goal: Stable Heart Rate and Rhythm  Outcome: Improving     Problem: Fluid Imbalance (Heart Failure)  Goal: Fluid Balance  Outcome: Improving   Remains free from injury. Call light within reach, does not always use call light to notify nursing of needs. Receiving lasix with good urine output.  Denies symptoms of dizziness, SOB, or palpitations at this time. Patient had 6 beat run of V-Tach around 0350. Patient asymptomatic at time of event. Provider notified. Continuing to monitor.

## 2021-07-30 NOTE — PROGRESS NOTES
Assessment & Plan     Chronic systolic (congestive) heart failure (H)  Dilated cardiomyopathy secondary to chronic meth abuse.  Noncompliance with medication.  Poor historian.  Unclear if he is not on his lisinopril or Lasix.  Complains of dyspnea and will check BNP although exam suggest that he is euvolemic.  - lisinopril (ZESTRIL) 5 MG tablet  Dispense: 90 tablet; Refill: 3  - Comprehensive metabolic panel (BMP + Alb, Alk Phos, ALT, AST, Total. Bili, TP)  - CBC with platelets    Dyspnea on exertion  Complaining of dyspnea with underlying chronic systolic congestive heart failure but appears euvolemic.  Hemodynamically stable.  Good O2 sats.  Significant component of generalized anxiety.  - BNP-N terminal pro    Generalized anxiety disorder  He is asking to have lorazepam filled.  I am reluctant to do this given his history of polysubstance abuse.  We discussed increasing his dose of hydroxyzine to 25 mg every 8 hours as needed and I am recommending that he start mirtazapine 7.5 mg at bedtime.  Previously prescribed Lexapro but was not able to tolerate.  Complaining of insomnia and mirtazapine should help with this and hopefully will be better tolerated.  - mirtazapine (REMERON) 7.5 MG tablet  Dispense: 90 tablet; Refill: 3  - hydrOXYzine (ATARAX) 25 MG tablet  Dispense: 60 tablet; Refill: 3    Mild persistent asthma without complication  We will refill his Symbicort and albuterol inhaler although lungs are clear without any expiratory wheezing today  - budesonide-formoterol (SYMBICORT) 160-4.5 MCG/ACT Inhaler  Dispense: 10.2 g; Refill: 3  - albuterol (PROAIR HFA/PROVENTIL HFA/VENTOLIN HFA) 108 (90 Base) MCG/ACT inhaler  Dispense: 18 g; Refill: 3    Hyperlipidemia, unspecified hyperlipidemia type  Recheck a lipid profile on atorvastatin  - Lipid Profile (Chol, Trig, HDL, LDL calc)    Prediabetes  Monitor A1c  - Hemoglobin A1c    Screening for prostate cancer    - PSA, screen    Urinary frequency    - UA Macro  with Reflex to Micro and Culture - lab collect             Return in about 3 weeks (around 8/19/2021) for Follow up.    Aguila Harrison MD  [facility]        Subjective       HPI 55-year-old male with chronic systolic congestive heart failure and history of meth abuse here complaining of increasing dyspnea and worsening anxiety.  See assessment and plan for details of visit    No current facility-administered medications for this visit.     No current outpatient medications on file.     Facility-Administered Medications Ordered in Other Visits   Medication     albuterol (PROAIR HFA/PROVENTIL HFA/VENTOLIN HFA) 108 (90 Base) MCG/ACT inhaler 2 puff     [START ON 7/30/2021] aspirin (ASA) chewable tablet 81 mg     atorvastatin (LIPITOR) tablet 40 mg     budesonide-formoterol (SYMBICORT) 160-4.5 MCG/ACT Inhaler 1 puff     Continuing ACE inhibitor/ARB/ARNI from home medication list OR ACE inhibitor/ARB/ARNI order already placed during this visit     Continuing beta blocker from home medication list OR beta blocker order already placed during this visit     furosemide (LASIX) injection 40 mg     [Held by provider] furosemide (LASIX) tablet 20 mg     HOLD: nitroGLYcerin IF     hydrOXYzine (ATARAX) tablet 25 mg     lisinopril (ZESTRIL) tablet 5 mg     metoprolol succinate ER (TOPROL-XL) 24 hr tablet 50 mg     mirtazapine (REMERON) tablet TABS 7.5 mg        Review of Systems    12 point ROS is negative other than what is described in assessment and plan and above      Objective    Vitals:    07/29/21 0937   BP: 100/77   BP Location: Right arm   Patient Position: Sitting   Cuff Size: Adult Regular   Pulse: 93   SpO2: 95%        Physical Exam  Chronically ill-appearing middle-aged male in no distress during our visit  Lungs clear bilaterally without rales or wheezes  Heart regular rate and rhythm  No peripheral edema

## 2021-07-30 NOTE — CONSULTS
Thank you, Dr. Cain, for asking the [facility] Heart Care team to see . Fan Villagomez for evaluation of CHF.      Assessment/Recommendations   Assessment/Plan:  1.  Acute on chronic systolic congestive heart failure, NYHA class III, non, LVEF 15 to 20% ischemic cardiomyopathy: The patient developed shortness of breath on exertion over last 3 to 4 months, gradually getting worse.  He gained 6 to 7 pounds.  He drinks a lots of Coke.  No sodium restriction.  The patient states that he has been taking his medications.  He responded to IV diuresis well.  He feels much better.  He lost more than 10 pounds.  Continue IV Lasix today, consider to change to oral tomorrow.  Continue metoprolol succinate to 50 mg daily, lisinopril 5 mg daily.  If his blood pressure tolerable, consider to add spironolactone.    2.  History of nonsustained ventricular tachycardia, status post ICD placement: Follow-up with device clinic.    3.  Coronary artery disease: Coronary angiogram in October 2020 was reported no obstructive lesion at [facility].  Continue aspirin, metoprolol and Lipitor.    4.  Dyslipidemia: Continue Lipitor 40 mg at bedtime.    5.  History of for drug abuse: Deferred to the primary team.       History of Present Illness/Subjective    It is my pleasure to see Fan Villagomez at the [facility] Heart Care clinic for evaluation of worsening SOB.  Fan Villagomez is a 55 year old male with a medical history of severe nonischemic cardiomyopathy LVEF 15 to 20%, severely enlarged left ventricle, most likely secondary to drug abuse marijuana and methamphetamine, chronic systolic congestive heart failure, nonsustained ventricular tachycardia status post ICD placement, nonobstructive coronary artery disease.    The patient states that he developed shortness of breath on exertion over last 3 to 4 months, gradually getting worse.  He could not breathe yesterday when he was at his primary care  "physician's office.  At that time, he feels dizzy lightheaded, chest discomfort.  He had no chest pain, no ICD discharge.  He has gained 6 to 7 pounds recently.  He has no leg edema.    His BNP was significantly elevated more than 5000.  Chest CTA was reported pulmonary embolism and pleural effusion.  His troponin is normal.  After admission, he received IV diuresis.  He feels better today.    Echocardiogram today was reported left ventricle severely enlarged, global hypokinesis, LVEF of 20 to 25%.     Physical Examination Review of Systems   BP 91/53 (BP Location: Right arm)   Pulse 69   Temp 97.4  F (36.3  C) (Oral)   Resp 22   Ht 1.753 m (5' 9\")   Wt 62.1 kg (136 lb 14.4 oz)   SpO2 98%   BMI 20.22 kg/m    Body mass index is 20.22 kg/m .  Wt Readings from Last 3 Encounters:   07/30/21 62.1 kg (136 lb 14.4 oz)   03/09/21 64.9 kg (143 lb)   03/02/20 65.8 kg (145 lb)       Intake/Output Summary (Last 24 hours) at 7/30/2021 1443  Last data filed at 7/30/2021 1144  Gross per 24 hour   Intake 2774 ml   Output 4730 ml   Net -1956 ml       General Appearance:   Awake, Alert, No acute distress.   HEENT:  No scleral icterus; the mucous membranes were moist.   Neck: No cervical bruits. No JVD. No thyromegaly.    Chest: The spine was straight. The chest was symmetric.   Lungs:   Respirations unlabored; Lungs are clear to auscultation. No crackles.  No wheezing.   Cardiovascular:   Regular rhythm and rate, normal first and second heart sounds with no murmurs. No rubs or gallops.    Abdomen:  Soft. No tenderness. Bowels sounds are present   Extremities: Equal tibial pulses. No leg edema.   Skin: No rashes. Warm, Dry.   Musculoskeletal: No tenderness.   Neurologic: Oriented x 3. Mood and affect are appropriate.     A 12 point comprehensive review of systems was negative except as noted.        Medical History  Surgical History Family History Social History   Past Medical History:   Diagnosis Date     Bee sting allergy " 10/15/2018     Chronic hepatitis C (H)     Evaluated by gastroenterology 2015 with fibro-scan showing only very mild fibrosis.  One-year follow-up recommended.  Normal LFTs October 2018.     Chronic systolic (congestive) heart failure (H)     Dilated cardiomyopathy with EF 10-15% and severe mitral regurgitation     Dilated cardiomyopathy (H)     Echocardiogram October 2018 with EF of 10-15% with severe mitral regurgitation and placement of ICD.  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  Positive urine drug screen January 2019 for THC and amphetamine.  Recommending chemical dependency treatment     Prediabetes     A1c 5.7% May 2017     Pulmonary nodule     No change from 2014 -2016     S/P ICD (internal cardiac defibrillator) procedure 10/15/2018    Overview:  [employer]tronic single chamber ICD     Tobacco abuse 12/19/2014     Urinary hesitancy 1/18/2019    BPH suspected.  Symptoms intermittent    Past Surgical History:   Procedure Laterality Date     CARDIAC DEFIBRILLATOR PLACEMENT  10/2018     FOOT SURGERY Left      surgery     HAND SURGERY Right     [facility]      INGUINAL HERNIA REPAIR Right     Family History   Problem Relation Age of Onset     Kidney failure Mother         dialysis     Cancer Father         unknown details     Diabetes Brother      Heart Failure Brother      No Known Problems Brother      Substance Abuse Brother      No  Known Problems Brother      No Known Problems Brother     Social History     Socioeconomic History     Marital status: Single     Spouse name: Not on file     Number of children: Not on file     Years of education: Not on file     Highest education level: Not on file   Occupational History     Not on file   Tobacco Use     Smoking status: Former Smoker     Packs/day: 1.00     Years: 39.00     Pack years: 39.00     Types: Cigarettes     Quit date: 2016     Years since quittin.7     Smokeless tobacco: Never Used   Substance and Sexual Activity     Alcohol use: Yes     Comment: Alcoholic Drinks/day: rare     Drug use: Yes     Types: Methamphetamines, Marijuana     Comment: Drug use: infrequent, last used meth      Sexual activity: Yes     Partners: Female   Other Topics Concern     Not on file   Social History Narrative    Lives with his brother, Atul.  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  One son, Don.       Social Determinants of Health     Financial Resource Strain:      Difficulty of Paying Living Expenses:    Food Insecurity:      Worried About Running Out of Food in the Last Year:      Ran Out of Food in the Last Year:    Transportation Needs:      Lack of Transportation (Medical):      Lack of Transportation (Non-Medical):    Physical Activity:      Days of Exercise per Week:      Minutes of Exercise per Session:    Stress:      Feeling of Stress :    Social Connections:      Frequency of Communication with Friends and Family:      Frequency of Social Gatherings with Friends and Family:      Attends Christianity Services:      Active Member of Clubs or Organizations:      Attends Club or Organization Meetings:      Marital Status:    Intimate Partner Violence:      Fear of Current or Ex-Partner:      Emotionally Abused:      Physically Abused:      Sexually Abused:           Medications  Allergies   Scheduled Meds:    aspirin  81 mg Oral Daily     atorvastatin  40 mg Oral QPM      budesonide-formoterol  1 puff Inhalation BID     furosemide  40 mg Oral BID     lisinopril  5 mg Oral Daily     metoprolol succinate ER  50 mg Oral Daily     mirtazapine  7.5 mg Oral At Bedtime     Continuous Infusions:    Continuing ACE inhibitor/ARB/ARNI from home medication list OR ACE inhibitor/ARB order already placed during this visit       - MEDICATION INSTRUCTIONS -       PRN Meds:.albuterol, Continuing ACE inhibitor/ARB/ARNI from home medication list OR ACE inhibitor/ARB order already placed during this visit, - MEDICATION INSTRUCTIONS -, HOLD MEDICATION, hydrOXYzine Allergies   Allergen Reactions     Bee Venom Anaphylaxis     Pt swell up ---Pt has EPI pens at home but has not had to use it yet.      No Known Drug Allergies Unknown         Lab Results    Chemistry/lipid CBC Cardiac Enzymes/BNP/TSH/INR   Lab Results   Component Value Date    CHOL 198 06/02/2015    HDL 41 06/02/2015    TRIG 382 (H) 06/02/2015    BUN 36 (H) 07/30/2021     07/30/2021    CO2 29 07/30/2021    Lab Results   Component Value Date    WBC 16.6 (H) 07/29/2021    HGB 15.4 07/29/2021    HCT 46.7 07/29/2021    MCV 97 07/29/2021     07/29/2021    Lab Results   Component Value Date    TROPONINI 0.04 07/30/2021    BNP >5,000 (H) 07/29/2021

## 2021-07-30 NOTE — PROGRESS NOTES
[facility] MEDICINE PROGRESS NOTE      Identification/Summary: Fan Villagomez is a 55 year old male with a past medical history of  who was admitted on 7/29/2021 for shortness of breath and palpitations.  Imaging revealed pulmonary edema and no pulmonary emboli.  Patient was given Lasix and admitted.  Echocardiogram reveals cardiomyopathy and severe mitral regurgitation.  Patient normally follows I believe through [facility] cardiology.  Cardiology consultation placed.  Continuing IV diuresis for now.    Assessment and Plan:  Acute on chronic systolic congestive heart failure.  IV Lasix scheduled.  Ins and outs.  Low-sodium diet.    Echocardiogram reveals severe mitral regurgitation and severe reduction of EF.  Cardiology consultation.  Continue IV diuresis.  Acute volume overload.  Diuresis.  Hepatitis C.  Emphysema/mild persistent asthma.  No acute wheezing.  Shortness of breath associated with pulmonary edema.  Monitor response to diuresis.  Consider nebs if needed.  Palpitations/chest tightness/history of NSVT.    Defibrillator interrogated.  Outpatient follow-up in device clinic.  Remote history of methamphetamine use.  Patient endorses sobriety.  Will check urine tox. Encouraged continued sobriety.  VTE prophylaxis. Low risk, ambulating    Diet: 2 Gram Sodium Diet Other - please comment  DVT Prophylaxis: Ambulation  Code Status: Full Code        Interval History/Subjective:  Patient is doing much better.  Awaiting echo when I admit him but later echo reviewed revealing the mitral regurg.  Cardiology to see the patient.  He denies any lower extremity edema.  No chest pain.  No nausea or vomiting.  Ambulating without issues.  We are going to continue IV Lasix until cardiology sees him and then as there are directive.    Physical Exam/Objective:  Temp:  [97.2  F (36.2  C)-97.9  F (36.6  C)] 97.9  F (36.6  C)  Pulse:  [69-99] 78  Resp:  [10-31] 20  BP: ()/(53-85)  101/61  SpO2:  [96 %-100 %] 99 %    Body mass index is 20.22 kg/m .    GENERAL:  Alert, appears comfortable, in no acute distress, appears stated age   HEAD:  Normocephalic, without obvious abnormality, atraumatic   EYES:  PERRL, conjunctiva/corneas clear, no scleral icterus, EOM's intact   NECK: Supple, symmetrical, trachea midline   BACK:   Symmetric, no curvature, ROM normal   LUNGS:   Clear to auscultation bilaterally, no rales, rhonchi, or wheezing, symmetric chest rise on inhalation, respirations unlabored   CHEST WALL:  No tenderness or deformity   HEART:  Regular rate and rhythm, S1 and S2 normal, no murmur, rub, or gallop    ABDOMEN:   Soft, non-tender, bowel sounds active all four quadrants, no masses, no organomegaly, no rebound or guarding   EXTREMITIES: Extremities normal, atraumatic, no cyanosis or edema    SKIN: Dry to touch, no exanthems in the visualized areas     Medications:   Personally Reviewed.  Medications     Continuing ACE inhibitor/ARB/ARNI from home medication list OR ACE inhibitor/ARB order already placed during this visit       - MEDICATION INSTRUCTIONS -         aspirin  81 mg Oral Daily     atorvastatin  40 mg Oral QPM     budesonide-formoterol  1 puff Inhalation BID     furosemide  40 mg Intravenous Q12H     [Held by provider] furosemide  20 mg Oral Daily     lisinopril  5 mg Oral Daily     metoprolol succinate ER  50 mg Oral Daily     mirtazapine  7.5 mg Oral At Bedtime       Data reviewed today: I personally reviewed all new medications, labs, imaging/diagnostics reports over the past 24 hours. Pertinent findings include:    Imaging:   Recent Results (from the past 24 hour(s))   CT Chest Pulmonary Embolism w Contrast    Narrative    EXAM: CT CHEST PULMONARY EMBOLISM W CONTRAST  LOCATION: [facility]  DATE/TIME: 7/29/2021 1:50 PM    INDICATION: Shortness of breath  COMPARISON: CT of the chest without contrast 12/2/2016  TECHNIQUE: CT chest pulmonary angiogram  during arterial phase injection of IV contrast. Multiplanar reformats and MIP reconstructions were performed. Dose reduction techniques were used.   CONTRAST: 60ml Isovue 370    FINDINGS:  ANGIOGRAM CHEST: Contrast was administered via the left upper extremity. There are numerous small veins in the low neck, anterior mediastinum, and around the upper thoracic spine which suggests flow limitation at the level of the left brachiocephalic   vein, likely related to the narrowing around the left subclavian approach defibrillator lead which terminates in the RV apex.    The main pulmonary artery is normal in size. Contrast mixing artifacts are present within the central pulmonary arteries. Poorly luminal opacification of subsegmental pulmonary arteries in the posterior basal segment of the left lower lobe related to low   cardiac output. The subsegmental vessels in the most inferior lungs are not well evaluated due to motion-related blurring. No pulmonary artery filling defects are present.    Normal caliber thoracic aorta with conventional arch anatomy. Luminal opacification of the aorta is suboptimal for evaluation of aortic dissection though there are no secondary findings of acute aortic syndrome.    LUNGS AND PLEURA: Upper zone predominant centrilobular emphysema. Smooth thickening of intralobular septa and peribronchial thickening in both lungs without an apical or basal zonal predominance consistent with interstitial lung edema. Minimal right   pleural effusion layers dependently. No left pleural fluid.    MEDIASTINUM: Severely dilated left atrium and ventricle. No pericardial effusion. Upper limit of normal size symmetric hilar, subcarinal, and lower paratracheal lymph nodes are likely reactive in the setting of lung edema. Esophagus is decompressed.    CORONARY ARTERY CALCIFICATION: Moderate.    UPPER ABDOMEN: Continuous column of refluxed contrast into the hepatic veins and mildly dilated  IVC.    MUSCULOSKELETAL: Normal.      Impression    IMPRESSION:    1.  Contrast mixing artifacts in the pulmonary arteries related to low cardiac output and slow flow. Evaluation of peripheral pulmonary arteries in the lung bases is suboptimal due to poor contrast opacification and motion artifacts. No acute pulmonary   embolism identified.  2.  Flow limitation at the level of the left brachiocephalic vein likely due to narrowing around the defibrillator lead.  3.  Emphysema.  4.  Severe enlargement of the left heart chambers.  5.  Symmetric interstitial lung edema and trace right pleural effusion.   Echocardiogram Complete   Result Value    LVEF  20-25%    [facility]    [id number]  [id number]  [id number]  [id number]^ADI^[place] [address]     Name: LUIS BROWNING  MRN: [id number]  : 1965  Study Date: 2021 08:21 AM  Age: 55 yrs  Gender: Male  Patient Location: [facility]  Reason For Study: Heart Failure  Ordering Physician: ADI HARTLEY  Performed By:      BSA: 1.8 m2  Height: 69 in  Weight: 136 lb  HR: 79  ______________________________________________________________________________  Procedure  Complete Echo Adult. Good quality two-dimensional was performed and  interpreted.  ______________________________________________________________________________  Interpretation Summary     The left ventricle is severely dilated. The visual ejection fraction is 20-  25%.  Grade III or advanced diastolic dysfunction.  Normal right ventricle size and systolic function.  Mitral annulus is dilated resulting in severe functional mitral regurgitation.  IVC diameter <2.1 cm collapsing >50% with sniff suggests a normal RA pressure  of 3 mmHg.  ______________________________________________________________________________  Left Ventricle  The left ventricle is severely dilated. There is severe eccentric left  ventricular hypertrophy. The visual ejection fraction is  20-25%. Grade III or  advanced diastolic dysfunction. There is diffuse hypokinesis of the left  ventricle.     Right Ventricle  Normal right ventricle size and systolic function. There is a ICD lead in the  right ventricle.     Atria  The left atrium is severely dilated. The right atrium is mildly dilated. There  is no color Doppler evidence of an atrial shunt.     Mitral Valve  The mitral valve leaflets are mildly thickened. Mitral annulus is dilated.  There is severe (4+) mitral regurgitation. There is no mitral valve stenosis.     Tricuspid Valve  Tricuspid valve leaflets appear normal. There is no evidence of tricuspid  stenosis or clinically significant tricuspid regurgitation. There is trace  tricuspid regurgitation.     Aortic Valve  Aortic valve leaflets appear normal. There is no evidence of aortic stenosis  or clinically significant aortic regurgitation.     Pulmonic Valve  The pulmonic valve is normal in structure and function.     Vessels  The thoracic aorta is normal. The aorta root is normal. IVC diameter <2.1 cm  collapsing >50% with sniff suggests a normal RA pressure of 3 mmHg.     Pericardium  There is no pericardial effusion.     Rhythm  The rhythm was paced.  ______________________________________________________________________________  MMode/2D Measurements & Calculations  IVSd: 0.84 cm  LVIDd: 9.1 cm  LVIDs: 8.3 cm  LVPWd: 0.88 cm  FS: 8.8 %  LV mass(C)d: 427.3 grams  LV mass(C)dI: 243.7 grams/m2  Ao root diam: 3.7 cm  LA dimension: 5.4 cm  asc Aorta Diam: 3.5 cm  LA/Ao: 1.5  LVOT diam: 2.3 cm  LVOT area: 4.0 cm2  LA Volume Indexed (AL/bp): 113.1 ml/m2     Time Measurements  MM HR: 75.0 BPM     Doppler Measurements & Calculations  MV E max chevy: 166.0 cm/sec  MV A max chevy: 83.2 cm/sec  MV E/A: 2.0  MV dec slope: 1153 cm/sec2  MV dec time: 0.14 sec  Ao V2 max: 104.7 cm/sec  Ao max P.0 mmHg  Ao V2 mean: 72.2 cm/sec  Ao mean P.4 mmHg  Ao V2 VTI: 20.1 cm  JABARI(I,D): 2.7 cm2  JABARI(V,D): 3.1  cm2  LV V1 max P.5 mmHg  LV V1 max: 79.8 cm/sec  LV V1 VTI: 13.4 cm  MR ERO: 0.41 cm2  MR volume: 82.3 ml  SV(LVOT): 53.8 ml  SI(LVOT): 30.7 ml/m2  PA acc time: 0.12 sec  TR max rui: 286.6 cm/sec  TR max P.8 mmHg  JABARI Index (cm2/m2): 1.5  E/E': 6.9  E/E' av.6  Lateral E/e': 20.9     Medial E/e': 24.2  Peak E' Rui: 24.0 cm/sec     ______________________________________________________________________________  Report approved by: Fredy Bridges 2021 10:47 AM             Labs:  Most Recent 3 CBC's:Recent Labs   Lab Test 21  1153 19  1146 19  1559   WBC 16.6*  --   --    HGB 15.4 15.1 13.9*   MCV 97  --   --      --   --      Most Recent 3 BMP's:Recent Labs   Lab Test 21  0500 21  1946 21  1153 21  1543     --  140 138   POTASSIUM 4.1 4.3 4.8 4.3   CHLORIDE 103  --  104 106   CO2 29  --  24 23   BUN 36*  --  30* 22   CR 1.20  --  1.13 0.98   ANIONGAP 9  --  12 9   MIGEL 8.3*  --  8.7 8.3*   GLC 96  --  111 102       Chung Cain MD  [facility]  Phone: #[phone]

## 2021-07-30 NOTE — H&P
[facility] MEDICINE ADMISSION HISTORY AND PHYSICAL     Assessment & Plan       Fan Villagomez is a 55 year old old male with history of chronic systolic congestive heart failure presented to the ER complaining of palpitations with lightheadedness and shortness of breath.  He had presented to his primary care clinic for evaluation and they sent him to the ER.  In the ER he was found to be volume overloaded and CT scan of the chest revealed no pulmonary emboli but did reveal pulmonary edema.  Patient was given Lasix and admitted.    Acute on chronic systolic congestive heart failure.  IV Lasix scheduled.  Ins and outs.  Low-sodium diet.  Echocardiogram in the a.m.  Serial troponins.  Already on a beta-blocker and ACE inhibitor.  Continue aspirin.  Acute volume overload.  Diuresis.  Hepatitis C.  Emphysema/mild persistent asthma.  No acute wheezing.  Shortness of breath associated with pulmonary edema.  Monitor response to diuresis.  Consider nebs if needed.  Palpitations/chest tightness/history of NSVT.  Interrogate defibrillator.  Monitor on telemetry.  Rule out with serial troponins.  Echocardiogram.  Potential cardiology consultation pending findings.  Remote history of methamphetamine use.  Patient endorses sobriety.  Will check urine tox.  Encouraged continued sobriety.  VTE prophylaxis. Low risk, ambulating      DVTP: Low Risk Patient   Code Status: No Order  Disposition: Inpatient   Expected LOS: 2 days     Chief Complaint  shortness of breath with palpitations     HISTORY     Fan Villagomez is a 55 year old old male with h/o dilated cardiomyopathy and chronic systolic congestive heart failure presents to the hospital complaining of dyspnea and palpitations with associated nausea and shortness of breath.  Patient went to see his primary doctor today and had an episode while there and he was directed to the hospital.  In the ER he was found to be volume overloaded and imaging  of the chest revealed pulmonary edema.  He also had a pleural effusion.  Patient was given IV Lasix.  Supplemental oxygen was started.  Reviewing records he has known EF of about 15% in October 2020.  He had an angio at that time that revealed nonocclusive coronary disease.  He is not fainted.  He feels better since being admitted to the hospital.  He does have an ICD in place.  He states that he would like to be full code.    Past Medical History     Past Medical History:  10/15/2018: Bee sting allergy  No date: Chronic hepatitis C (H)      Comment:  Evaluated by gastroenterology 2015 with fibro-scan                showing only very mild fibrosis.  One-year follow-up                recommended.  Normal LFTs October 2018.  No date: Chronic systolic (congestive) heart failure (H)      Comment:  Dilated cardiomyopathy with EF 10-15% and severe mitral                regurgitation  No date: Dilated cardiomyopathy (H)      Comment:  Echocardiogram October 2018 with EF of 10-15% with                severe mitral regurgitation and placement of ICD.                 Hospitalized October 7, 2018 with acute CHF  7/29/2021: Generalized anxiety disorder  No date: Hyperlipidemia  3/28/2019: Mass of parotid gland      Comment:  Incidental finding of 2 small masses in right parotid                gland  10/8/2018: Mild persistent asthma without complication  05/31/2017: Non-occlusive coronary artery disease      Comment:  Coronary angiogram October 2018 with moderate 40-60%                lesions involving multiple vessels  No date: NSVT (nonsustained ventricular tachycardia) (H)      Comment:  Hospitalized with ICD discharge secondary to                nonsustained VT February 2020 7/27/2018: Palpitations  No date: Polysubstance abuse (H)      Comment:  Methamphetamine and THC.  Positive urine drug screen                January 2019 for THC and amphetamine.  Recommending                chemical dependency treatment  No date:  Prediabetes      Comment:  A1c 5.7% May 2017  No date: Pulmonary nodule      Comment:  No change from  -2016  10/15/2018: S/P ICD (internal cardiac defibrillator) procedure      Comment:  Overview:  [employer] single chamber ICD  2014: Tobacco abuse  2019: Urinary hesitancy      Comment:  BPH suspected.  Symptoms intermittent     Surgical History     Past Surgical History:   Procedure Laterality Date     CARDIAC DEFIBRILLATOR PLACEMENT  10/2018     FOOT SURGERY Left      surgery     HAND SURGERY Right     [facility]      INGUINAL HERNIA REPAIR Right      Family History    Reviewed, and   Family History   Problem Relation Age of Onset     Kidney failure Mother         dialysis     Cancer Father         unknown details     Diabetes Brother      Heart Failure Brother      No Known Problems Brother      Substance Abuse Brother      No Known Problems Brother      No Known Problems Brother         Social History      Social History     Tobacco Use     Smoking status: Former Smoker     Packs/day: 1.00     Years: 39.00     Pack years: 39.00     Types: Cigarettes     Quit date: 2016     Years since quittin.7     Smokeless tobacco: Never Used   Substance Use Topics     Alcohol use: Yes     Comment: Alcoholic Drinks/day: rare     Drug use: Yes     Types: Methamphetamines, Marijuana     Comment: Drug use: infrequent, last used meth 2010        Allergies     Allergies   Allergen Reactions     Bee Venom Anaphylaxis     Pt swell up ---Pt has EPI pens at home but has not had to use it yet.      No Known Drug Allergies Unknown     Prior to Admission Medications      Prior to Admission Medications   Prescriptions Last Dose Informant Patient Reported? Taking?   EPINEPHrine (EPIPEN/ADRENACLICK/AUVI-Q) 0.3 mg/0.3 mL injection   No Yes   Sig: [EPINEPHRINE (EPIPEN/ADRENACLICK/AUVI-Q) 0.3 MG/0.3 ML INJECTION] Inject 0.3 mL (0.3 mg total) as directed as needed for anaphylaxis. Inject into thigh.   albuterol  (PROAIR HFA/PROVENTIL HFA/VENTOLIN HFA) 108 (90 Base) MCG/ACT inhaler   No Yes   Sig: Inhale 2 puffs into the lungs every 6 hours as needed for shortness of breath / dyspnea   aspirin 81 mg chewable tablet 7/29/2021 at in ED  Yes Yes   Sig: [ASPIRIN 81 MG CHEWABLE TABLET] Chew 81 mg.   atorvastatin (LIPITOR) 40 MG tablet 7/28/2021 at Unknown time  No Yes   Sig: [ATORVASTATIN (LIPITOR) 40 MG TABLET] TAKE 1 TAB BY MOUTH DAILY AT BEDTIME.   budesonide-formoterol (SYMBICORT) 160-4.5 MCG/ACT Inhaler   No Yes   Sig: Inhale 1 puff into the lungs 2 times daily   furosemide (LASIX) 20 MG tablet 7/27/2021  No Yes   Sig: [FUROSEMIDE (LASIX) 20 MG TABLET] Take 1 tablet (20 mg total) by mouth daily.   hydrOXYzine (ATARAX) 25 MG tablet 7/26/2021  No Yes   Sig: Take 1 tablet (25 mg) by mouth every 8 hours as needed for anxiety   lisinopril (ZESTRIL) 5 MG tablet 3 months ago  No Yes   Sig: Take 1 tablet (5 mg) by mouth daily   metoprolol succinate (TOPROL XL) 50 MG 24 hr tablet 7/28/2021 at Unknown time  No Yes   Sig: [METOPROLOL SUCCINATE (TOPROL XL) 50 MG 24 HR TABLET] Take 1 tablet (50 mg total) by mouth daily.   mirtazapine (REMERON) 7.5 MG tablet hasn't started yet  No Yes   Sig: Take 1 tablet (7.5 mg) by mouth At Bedtime      Facility-Administered Medications: None      Review of Systems     A 12 point comprehensive review of systems was negative except as noted above in HPI.    PHYSICAL EXAMINATION       Vitals      Temp:  [97.2  F (36.2  C)] 97.2  F (36.2  C)  Pulse:  [79-99] 96  Resp:  [10-31] 26  BP: ()/(68-85) 113/74  SpO2:  [95 %-100 %] 99 %    Examination     GENERAL:  Alert, appears comfortable, in no acute distress, appears stated age   HEAD:  Normocephalic, without obvious abnormality, atraumatic   EYES:  PERRL, conjunctiva/corneas clear, no scleral icterus, EOM's intact   NOSE: Nares normal, septum midline, mucosa normal, no drainage   THROAT: Lips, mucosa, and tongue normal; teeth and gums normal, mouth  moist   NECK: Supple, symmetrical, trachea midline   BACK:   Symmetric, no curvature, ROM normal   LUNGS:   Clear to auscultation bilaterally, no rales, rhonchi, or wheezing, symmetric chest rise on inhalation, respirations unlabored   CHEST WALL:  No tenderness or deformity   HEART:  Regular rate and rhythm, S1 and S2 normal, no murmur, rub, or gallop    ABDOMEN:   Soft, non-tender, bowel sounds active all four quadrants, no masses, no organomegaly, no rebound or guarding   EXTREMITIES: Extremities normal, atraumatic, no cyanosis or edema    SKIN: Dry to touch, no exanthems in the visualized areas   NEURO: Alert, oriented x 4, moves all four extremities freely, non-focal   PSYCH: Cooperative, behavior is appropriate      Pertinent Lab     Most Recent 3 CBC's:Recent Labs   Lab Test 07/29/21  1153 03/28/19  1146 01/18/19  1559   WBC 16.6*  --   --    HGB 15.4 15.1 13.9*   MCV 97  --   --      --   --      Most Recent 3 BMP's:Recent Labs   Lab Test 07/29/21  1153 03/09/21  1543 03/28/19  1146    138 138   POTASSIUM 4.8 4.3 4.1   CHLORIDE 104 106 105   CO2 24 23 22   BUN 30* 22 23*   CR 1.13 0.98 1.08   ANIONGAP 12 9 11   MIGEL 8.7 8.3* 9.7    102 119         Pertinent Radiology     Radiology Results:   Recent Results (from the past 24 hour(s))   CT Chest Pulmonary Embolism w Contrast    Narrative    EXAM: CT CHEST PULMONARY EMBOLISM W CONTRAST  LOCATION: [facility]  DATE/TIME: 7/29/2021 1:50 PM    INDICATION: Shortness of breath  COMPARISON: CT of the chest without contrast 12/2/2016  TECHNIQUE: CT chest pulmonary angiogram during arterial phase injection of IV contrast. Multiplanar reformats and MIP reconstructions were performed. Dose reduction techniques were used.   CONTRAST: 60ml Isovue 370    FINDINGS:  ANGIOGRAM CHEST: Contrast was administered via the left upper extremity. There are numerous small veins in the low neck, anterior mediastinum, and around the upper  thoracic spine which suggests flow limitation at the level of the left brachiocephalic   vein, likely related to the narrowing around the left subclavian approach defibrillator lead which terminates in the RV apex.    The main pulmonary artery is normal in size. Contrast mixing artifacts are present within the central pulmonary arteries. Poorly luminal opacification of subsegmental pulmonary arteries in the posterior basal segment of the left lower lobe related to low   cardiac output. The subsegmental vessels in the most inferior lungs are not well evaluated due to motion-related blurring. No pulmonary artery filling defects are present.    Normal caliber thoracic aorta with conventional arch anatomy. Luminal opacification of the aorta is suboptimal for evaluation of aortic dissection though there are no secondary findings of acute aortic syndrome.    LUNGS AND PLEURA: Upper zone predominant centrilobular emphysema. Smooth thickening of intralobular septa and peribronchial thickening in both lungs without an apical or basal zonal predominance consistent with interstitial lung edema. Minimal right   pleural effusion layers dependently. No left pleural fluid.    MEDIASTINUM: Severely dilated left atrium and ventricle. No pericardial effusion. Upper limit of normal size symmetric hilar, subcarinal, and lower paratracheal lymph nodes are likely reactive in the setting of lung edema. Esophagus is decompressed.    CORONARY ARTERY CALCIFICATION: Moderate.    UPPER ABDOMEN: Continuous column of refluxed contrast into the hepatic veins and mildly dilated IVC.    MUSCULOSKELETAL: Normal.      Impression    IMPRESSION:    1.  Contrast mixing artifacts in the pulmonary arteries related to low cardiac output and slow flow. Evaluation of peripheral pulmonary arteries in the lung bases is suboptimal due to poor contrast opacification and motion artifacts. No acute pulmonary   embolism identified.  2.  Flow limitation at the level  of the left brachiocephalic vein likely due to narrowing around the defibrillator lead.  3.  Emphysema.  4.  Severe enlargement of the left heart chambers.  5.  Symmetric interstitial lung edema and trace right pleural effusion.     EKG Results: personally reviewed.     Advance Care Planning        Chung Cain MD  [facility]   Phone: #[phone]

## 2021-07-30 NOTE — UTILIZATION REVIEW
Admission Status; Secondary Review Determination   Under the authority of the Utilization Management Committee, the utilization review process indicated a secondary review on Fan Villagomez. The review outcome is based on review of the medical records, discussions with staff, and applying clinical experience noted on the date of the review.   (x) Inpatient Status Appropriate - This patient's medical care is consistent with medical management for inpatient care and reasonable inpatient medical practice.     RATIONALE FOR DETERMINATION   55 yr old male with acute on chronic systolic CHF with underlying ischemic CM.  Cardiology following.  Continues on IV diuresis.  EF 15-20%.  Was on oxygen on arrival for tachypnea to 26.    At the time of admission with the information available to the attending physician more than 2 nights Hospital complex care was anticipated, based on patient risk of adverse outcome if treated as outpatient and complex care required. Inpatient admission is appropriate based on the Medicare guidelines.   The information on this document is developed by the utilization review team in order for the business office to ensure compliance. This only denotes the appropriateness of proper admission status and does not reflect the quality of care rendered.   The definitions of Inpatient Status and Observation Status used in making the determination above are those provided in the CMS Coverage Manual, Chapter 1 and Chapter 6, section 70.4.   Sincerely,   Rubia Smalls MD  Utilization Review  Physician Advisor  [facility]

## 2021-07-31 VITALS
BODY MASS INDEX: 19.71 KG/M2 | TEMPERATURE: 97.2 F | DIASTOLIC BLOOD PRESSURE: 70 MMHG | HEART RATE: 81 BPM | HEIGHT: 69 IN | OXYGEN SATURATION: 98 % | RESPIRATION RATE: 20 BRPM | SYSTOLIC BLOOD PRESSURE: 100 MMHG | WEIGHT: 133.1 LBS

## 2021-07-31 LAB
ANION GAP SERPL CALCULATED.3IONS-SCNC: 8 MMOL/L (ref 5–18)
BUN SERPL-MCNC: 30 MG/DL (ref 8–22)
CALCIUM SERPL-MCNC: 8.2 MG/DL (ref 8.5–10.5)
CHLORIDE BLD-SCNC: 100 MMOL/L (ref 98–107)
CO2 SERPL-SCNC: 28 MMOL/L (ref 22–31)
CREAT SERPL-MCNC: 0.99 MG/DL (ref 0.7–1.3)
ERYTHROCYTE [DISTWIDTH] IN BLOOD BY AUTOMATED COUNT: 13.5 % (ref 10–15)
GFR SERPL CREATININE-BSD FRML MDRD: 85 ML/MIN/1.73M2
GLUCOSE BLD-MCNC: 97 MG/DL (ref 70–125)
HCT VFR BLD AUTO: 42.8 % (ref 40–53)
HGB BLD-MCNC: 14.6 G/DL (ref 13.3–17.7)
MAGNESIUM SERPL-MCNC: 1.9 MG/DL (ref 1.8–2.6)
MCH RBC QN AUTO: 32.2 PG (ref 26.5–33)
MCHC RBC AUTO-ENTMCNC: 34.1 G/DL (ref 31.5–36.5)
MCV RBC AUTO: 95 FL (ref 78–100)
PLATELET # BLD AUTO: 248 10E3/UL (ref 150–450)
POTASSIUM BLD-SCNC: 3.7 MMOL/L (ref 3.5–5)
RBC # BLD AUTO: 4.53 10E6/UL (ref 4.4–5.9)
SODIUM SERPL-SCNC: 136 MMOL/L (ref 136–145)
WBC # BLD AUTO: 12.9 10E3/UL (ref 4–11)

## 2021-07-31 PROCEDURE — 99239 HOSP IP/OBS DSCHRG MGMT >30: CPT | Performed by: FAMILY MEDICINE

## 2021-07-31 PROCEDURE — 250N000013 HC RX MED GY IP 250 OP 250 PS 637: Performed by: FAMILY MEDICINE

## 2021-07-31 PROCEDURE — 80048 BASIC METABOLIC PNL TOTAL CA: CPT | Performed by: FAMILY MEDICINE

## 2021-07-31 PROCEDURE — 83735 ASSAY OF MAGNESIUM: CPT | Performed by: INTERNAL MEDICINE

## 2021-07-31 PROCEDURE — 85014 HEMATOCRIT: CPT | Performed by: FAMILY MEDICINE

## 2021-07-31 PROCEDURE — 36415 COLL VENOUS BLD VENIPUNCTURE: CPT | Performed by: FAMILY MEDICINE

## 2021-07-31 RX ORDER — FUROSEMIDE 40 MG
40 TABLET ORAL DAILY
Qty: 30 TABLET | Refills: 1 | Status: SHIPPED | OUTPATIENT
Start: 2021-07-31

## 2021-07-31 RX ADMIN — BUDESONIDE AND FORMOTEROL FUMARATE DIHYDRATE 1 PUFF: 160; 4.5 AEROSOL RESPIRATORY (INHALATION) at 08:06

## 2021-07-31 RX ADMIN — LISINOPRIL 5 MG: 5 TABLET ORAL at 08:05

## 2021-07-31 RX ADMIN — METOPROLOL SUCCINATE 50 MG: 50 TABLET, EXTENDED RELEASE ORAL at 08:06

## 2021-07-31 RX ADMIN — FUROSEMIDE 40 MG: 40 TABLET ORAL at 08:06

## 2021-07-31 RX ADMIN — ASPIRIN 81 MG CHEWABLE TABLET 81 MG: 81 TABLET CHEWABLE at 08:05

## 2021-07-31 RX ADMIN — HYDROXYZINE HYDROCHLORIDE 25 MG: 25 TABLET, FILM COATED ORAL at 00:11

## 2021-07-31 ASSESSMENT — MIFFLIN-ST. JEOR: SCORE: 1429.12

## 2021-07-31 NOTE — PLAN OF CARE
Problem: Fluid Imbalance (Heart Failure)  Goal: Fluid Balance  Outcome: Improving   Patient had uneventful night, VSS, remains in SR with 1 degree AVB. Patient responding well to diuresis. Lung sounds clear, on RA overnight. Nursing will continue to monitor.     Carlos Benoit RN on 7/31/2021 at 3:51 AM

## 2021-07-31 NOTE — PLAN OF CARE
Problem: Fluid Imbalance (Heart Failure)  Goal: Fluid Balance  Outcome: Adequate for Discharge       Discharge education provided about follow up appointments, medications, and daily care. Patient verbalized understanding.

## 2021-07-31 NOTE — DISCHARGE SUMMARY
[facility] MEDICINE  DISCHARGE SUMMARY     Primary Care Physician: Aguila Harrison  Admission Date: 7/29/2021   Discharge Provider: Chung Cain MD Discharge Date: 7/31/2021   Diet:   Active Diet and Nourishment Order   Procedures     2 Gram Sodium Diet Other - please comment     Diet       Code Status: Full Code   Activity: DCACTIVITY: Activity as tolerated        Condition at Discharge: Stable     REASON FOR PRESENTATION(See Admission Note for Details)   Course of breath/palpitations    PRINCIPAL & ACTIVE DISCHARGE DIAGNOSES     Active Problems:    Palpitations    Hypervolemia, unspecified hypervolemia type      PENDING LABS     Unresulted Labs Ordered in the Past 30 Days of this Admission     No orders found from 6/29/2021 to 7/30/2021.            PROCEDURES ( this hospitalization only)          RECOMMENDATIONS TO OUTPATIENT PROVIDER FOR F/U VISIT     Follow-up Appointments     Add follow up information to the AVS prior to printing      Follow-up and recommended labs and tests       Follow up with primary care provider, Aguila Harrison, within 3-5   days, to evaluate medication change and for hospital follow- up. The   following labs/tests are recommended: bmp, heme2.  Blood pressure check. Eval volume status.               DISPOSITION     Home    SUMMARY OF HOSPITAL COURSE:    HPI: 55-year-old male with a history of chronic systolic congestive heart failure presented to the ER complaining of palpitations, lightheadedness, and shortness of breath.  CT scan of the chest revealed pulmonary edema and no PE.  He was admitted.      Acute on chronic systolic congestive heart failure.  Patient was admitted and diuresed.  Echocardiogram revealed severe mitral insufficiency and EF of 15 to 20%.  Cardiology was asked to see the patient.  They agreed with plan for diuresis.  Patient's volume status was negative and he was transitioned to oral diuretics.  He was  requesting discharge.  Follow-up in the heart failure clinic in 3 to 5 days.  He will follow up with his primary within 3 to 5 days and in the heart failure clinic in approximately 2 weeks.  Suspect patient had not been taking his ACE inhibitor and Lasix prior to admission.  Lasix dose was increased from 20 mg daily to 40 at discharge.  Will need repeat basic metabolic panel at follow-up.  History of NSVT.  Cardiology recommended follow-up in device clinic.  He had no significant arrhythmias while here in the hospital.  We did have his defibrillator interrogated.  Emphysema/mild persistent asthma.  No acute issues here in the hospital.  Hepatitis C.    Discharge Medications with Med changes:     Current Discharge Medication List      CONTINUE these medications which have CHANGED    Details   furosemide (LASIX) 40 MG tablet Take 1 tablet (40 mg) by mouth daily  Qty: 30 tablet, Refills: 1    Associated Diagnoses: Hypervolemia, unspecified hypervolemia type; Chronic systolic (congestive) heart failure (H); Dilated cardiomyopathy (H)         CONTINUE these medications which have NOT CHANGED    Details   albuterol (PROAIR HFA/PROVENTIL HFA/VENTOLIN HFA) 108 (90 Base) MCG/ACT inhaler Inhale 2 puffs into the lungs every 6 hours as needed for shortness of breath / dyspnea  Qty: 18 g, Refills: 3    Comments: Pharmacy may dispense brand covered by insurance (Proair, or proventil or ventolin or generic albuterol inhaler)  Associated Diagnoses: Mild persistent asthma without complication      aspirin 81 mg chewable tablet [ASPIRIN 81 MG CHEWABLE TABLET] Chew 81 mg.      atorvastatin (LIPITOR) 40 MG tablet [ATORVASTATIN (LIPITOR) 40 MG TABLET] TAKE 1 TAB BY MOUTH DAILY AT BEDTIME.  Qty: 90 tablet, Refills: 3    Associated Diagnoses: Hyperlipidemia, unspecified hyperlipidemia type      budesonide-formoterol (SYMBICORT) 160-4.5 MCG/ACT Inhaler Inhale 1 puff into the lungs 2 times daily  Qty: 10.2 g, Refills: 3    Associated  Diagnoses: Mild persistent asthma without complication      EPINEPHrine (EPIPEN/ADRENACLICK/AUVI-Q) 0.3 mg/0.3 mL injection [EPINEPHRINE (EPIPEN/ADRENACLICK/AUVI-Q) 0.3 MG/0.3 ML INJECTION] Inject 0.3 mL (0.3 mg total) as directed as needed for anaphylaxis. Inject into thigh.  Qty: 2, Refills: 3    Comments: Please dispense product with lowest cost to patient      hydrOXYzine (ATARAX) 25 MG tablet Take 1 tablet (25 mg) by mouth every 8 hours as needed for anxiety  Qty: 60 tablet, Refills: 3    Associated Diagnoses: Generalized anxiety disorder      lisinopril (ZESTRIL) 5 MG tablet Take 1 tablet (5 mg) by mouth daily  Qty: 90 tablet, Refills: 3    Associated Diagnoses: Chronic systolic (congestive) heart failure (H)      metoprolol succinate (TOPROL XL) 50 MG 24 hr tablet [METOPROLOL SUCCINATE (TOPROL XL) 50 MG 24 HR TABLET] Take 1 tablet (50 mg total) by mouth daily.  Qty: 30 tablet, Refills: 11    Associated Diagnoses: Chronic systolic (congestive) heart failure (H)      mirtazapine (REMERON) 7.5 MG tablet Take 1 tablet (7.5 mg) by mouth At Bedtime  Qty: 90 tablet, Refills: 3    Comments: Replaces Lexapro which is discontinued  Associated Diagnoses: Generalized anxiety disorder                   Rationale for medication changes:      Volume overload/CHF        Consults       SOCIAL WORK IP CONSULT  OCCUPATIONAL THERAPY ADULT IP CONSULT  NUTRITION SERVICES ADULT IP CONSULT  CARE MANAGEMENT / SOCIAL WORK IP CONSULT  CARDIOLOGY IP CONSULT    Immunizations given this encounter     Most Recent Immunizations   Administered Date(s) Administered     FLU 6-35 months 10/20/2011     HepB-Adult 03/21/2006     Influenza (IIV3) PF 10/20/2011     Influenza Vaccine IM > 6 months Valent IIV4 10/28/2020     Pneumo Conj 13-V (2010&after) 06/02/2015     Pneumococcal 23 valent 01/06/2021     Td (Adult), Adsorbed 05/20/2011     Tdap (Adacel,Boostrix) 06/02/2015     Zoster vaccine recombinant adjuvanted (SHINGRIX) 01/06/2021            Anticoagulation Information      Recent INR results: No results for input(s): INR in the last 168 hours.  Warfarin doses (if applicable) or name of other anticoagulant:       SIGNIFICANT IMAGING FINDINGS     Results for orders placed or performed during the hospital encounter of 07/29/21   CT Chest Pulmonary Embolism w Contrast    Impression    IMPRESSION:    1.  Contrast mixing artifacts in the pulmonary arteries related to low cardiac output and slow flow. Evaluation of peripheral pulmonary arteries in the lung bases is suboptimal due to poor contrast opacification and motion artifacts. No acute pulmonary   embolism identified.  2.  Flow limitation at the level of the left brachiocephalic vein likely due to narrowing around the defibrillator lead.  3.  Emphysema.  4.  Severe enlargement of the left heart chambers.  5.  Symmetric interstitial lung edema and trace right pleural effusion.   Echocardiogram Complete   Result Value Ref Range    LVEF  20-25%        SIGNIFICANT LABORATORY FINDINGS     Most Recent 3 CBC's:Recent Labs   Lab Test 07/31/21  0604 07/29/21  1153 03/28/19  1146   WBC 12.9* 16.6*  --    HGB 14.6 15.4 15.1   MCV 95 97  --     320  --      Most Recent 3 BMP's:Recent Labs   Lab Test 07/31/21  0604 07/30/21  0500 07/29/21  1946 07/29/21  1153    141  --  140   POTASSIUM 3.7 4.1 4.3 4.8   CHLORIDE 100 103  --  104   CO2 28 29  --  24   BUN 30* 36*  --  30*   CR 0.99 1.20  --  1.13   ANIONGAP 8 9  --  12   MIGEL 8.2* 8.3*  --  8.7   GLC 97 96  --  111           Discharge Orders        Reason for your hospital stay    Acute volume overload due to chronic heart failure     Follow-up and recommended labs and tests     Follow up with primary care provider, Aguila Harrison, within 3-5 days, to evaluate medication change and for hospital follow- up. The following labs/tests are recommended: bmp, heme2.  Blood pressure check. Eval volume status.     Activity    Your activity upon discharge:  activity as tolerated     Monitor and record    Weigh yourself every morning     When to contact your care team    Contact your care team If increased shortness of breath, If waking up at night with difficulty breathing, If unable to lie down for sleep due to symptoms, If weight gain of 2 pounds a day for 2 days in a row OR 5 pounds in 1 week., Increased swelling in your ankles or legs, and Dizziness or lightheadedness     Discharge Follow Up - with Primary Care clinic within 3-5 days (RN to schedule prior to d/c for HE/Entira primary care     Add follow up information to the AVS prior to printing     Diet    Follow this diet upon discharge: Orders Placed This Encounter      2 Gram Sodium Diet Other - please comment       Examination   Physical Exam   Temp:  [96.8  F (36  C)-97.4  F (36.3  C)] 97.2  F (36.2  C)  Pulse:  [65-82] 81  Resp:  [20-22] 20  BP: ()/(53-70) 100/70  SpO2:  [93 %-98 %] 98 %  Wt Readings from Last 1 Encounters:   07/31/21 60.4 kg (133 lb 1.6 oz)       General Appearance: No apparent distress  Respiratory: Clear to auscultation bilaterally  Cardiovascular: Regular rate and rhythm  GI: Soft and nontender with positive bowel sounds  Skin: Skin is normal without rashes, multiple tattoos  Other: Good eye contact and normal affect requesting discharge      Please see EMR for more detailed significant labs, imaging, consultant notes etc.    IChung MD, personally saw the patient today and spent greater than 30 minutes discharging this patient.    Chung Cain MD  [facility]    CC:Aguila Harrison

## 2021-07-31 NOTE — PLAN OF CARE
Occupational Therapy: Orders received. Chart reviewed and discussed with RN.? Occupational Therapy not indicated due to pt declining need for services due to independent with ADLs.? Defer discharge recommendations to RN.? Will complete orders.  Please reorder if status changes.    Yohana Menendez, KELLYRL

## 2021-08-02 ENCOUNTER — PATIENT OUTREACH (OUTPATIENT)
Dept: CARE COORDINATION | Facility: CLINIC | Age: 56
End: 2021-08-02

## 2021-08-02 DIAGNOSIS — Z71.89 OTHER SPECIFIED COUNSELING: ICD-10-CM

## 2021-08-02 NOTE — PROGRESS NOTES
Clinic Care Coordination Contact  [facility]/Voicemail       Clinical Data: Care Coordinator Outreach  Outreach attempted x 1.  Left message on patient's voicemail with call back information and requested return call.  Care Coordinator will try to reach patient again in 1-2 business days.

## 2021-08-03 PROBLEM — I42.9 CARDIOMYOPATHY (H): Status: RESOLVED | Noted: 2017-05-31 | Resolved: 2018-11-15

## 2021-08-03 NOTE — PROGRESS NOTES
"Clinic Care Coordination Contact  Community Health Worker Initial Outreach            Patient accepts CC: Patient stated he was good and didn't need anything and had no questions or concerns. CTA will close referral at this time.      [facility]: Post-Discharge Note  SITUATION                                                      Admission:    Admission Date: 07/29/21   Reason for Admission: Course of breath/palpitations  Discharge:   Discharge Date: 07/31/21  Discharge Diagnosis: alpitations    BACKGROUND                                                      HPI: 55-year-old male with a history of chronic systolic congestive heart failure presented to the ER complaining of palpitations, lightheadedness, and shortness of breath.  CT scan of the chest revealed pulmonary edema and no PE.  He was admitted.        Acute on chronic systolic congestive heart failure.  Patient was admitted and diuresed.  Echocardiogram revealed severe mitral insufficiency and EF of 15 to 20%.  Cardiology was asked to see the patient.  They agreed with plan for diuresis.  Patient's volume status was negative and he was transitioned to oral diuretics.  He was requesting discharge.  Follow-up in the heart failure clinic in 3 to 5 days.  He will follow up with his primary within 3 to 5 days and in the heart failure clinic in approximately 2 weeks.  Suspect patient had not been taking his ACE inhibitor and Lasix prior to admission.  Lasix dose was increased from 20 mg daily to 40 at discharge.  Will need repeat basic metabolic panel at follow-up.  History of NSVT.  Cardiology recommended follow-up in device clinic.  He had no significant arrhythmias while here in the hospital.  We did have his defibrillator interrogated.  Emphysema/mild persistent asthma.  No acute issues here in the hospital.  Hepatitis C.  ASSESSMENT      Discharge Assessment  How are you doing now that you are home?: \" I'm Good just cant take the heat outsidde sometimes " "\"  How are your symptoms? (Red Flag symptoms escalate to triage hotline per guidelines): Improved  Do you feel your condition is stable enough to be safe at home until your provider visit?: Yes  Does the patient have their discharge instructions? : Yes  Does the patient have questions regarding their discharge instructions? : No  Were you started on any new medications or were there changes to any of your previous medications? : Yes - Schedule RN[facility] appt within 48 business hours  Does the patient have all of their medications?: Yes  Do you have questions regarding any of your medications? : No  Do you have all of your needed medical supplies or equipment (DME)?  (i.e. oxygen tank, CPAP, cane, etc.): Yes  Discharge follow-up appointment scheduled within 14 calendar days? : Yes (Yes but not within 14 days)  Discharge Follow Up Appointment Date: 08/27/21  Discharge Follow Up Appointment Scheduled with?: Primary Care Provider  Is patient agreeable to assistance with scheduling? : No    Post-op  Did the patient have surgery or a procedure: No  Fever: No  Chills: No  Eating & Drinking: eating and drinking without complaints/concerns  PO Intake: regular diet  Bowel Function: normal  Date of last BM: 08/02/21  Urinary Status: voiding without complaint/concerns      PLAN                                                      Outpatient Plan:  Follow up with primary care provider, Aguila Harrison, within 3-5 days, to evaluate medication change and for hospital follow- up. The following labs/tests are recommended: bmp, heme2.  Blood pressure check. Eval volume status.          Activity     Your activity upon discharge: activity as tolerated          Monitor and record     Weigh yourself every morning          Future Appointments   Date Time Provider Department Center   8/27/2021  2:50 PM [facility]         For any urgent concerns, please contact our 24 hour nurse triage line: [phone] " ([phone])         Therese Castro MA

## 2021-08-06 ENCOUNTER — NURSE TRIAGE (OUTPATIENT)
Dept: NURSING | Facility: CLINIC | Age: 56
End: 2021-08-06

## 2021-08-06 LAB
ATRIAL RATE - MUSE: 53 BPM
ATRIAL RATE - MUSE: 99 BPM
DIASTOLIC BLOOD PRESSURE - MUSE: 70 MMHG
DIASTOLIC BLOOD PRESSURE - MUSE: 77 MMHG
INTERPRETATION ECG - MUSE: NORMAL
INTERPRETATION ECG - MUSE: NORMAL
P AXIS - MUSE: 61 DEGREES
P AXIS - MUSE: NORMAL DEGREES
PR INTERVAL - MUSE: 200 MS
PR INTERVAL - MUSE: NORMAL MS
QRS DURATION - MUSE: 124 MS
QRS DURATION - MUSE: 156 MS
QT - MUSE: 404 MS
QT - MUSE: 478 MS
QTC - MUSE: 485 MS
QTC - MUSE: 518 MS
R AXIS - MUSE: -48 DEGREES
R AXIS - MUSE: 71 DEGREES
SYSTOLIC BLOOD PRESSURE - MUSE: 102 MMHG
SYSTOLIC BLOOD PRESSURE - MUSE: 98 MMHG
T AXIS - MUSE: -68 DEGREES
T AXIS - MUSE: 109 DEGREES
VENTRICULAR RATE- MUSE: 62 BPM
VENTRICULAR RATE- MUSE: 99 BPM

## 2021-08-06 NOTE — TELEPHONE ENCOUNTER
Pt reports that his PCP changed his anxiety medications as follows:    - lorazepam was not refilled at this time.  - hydroxyzine was increased to 25 mg every 8 hours  - mirtazapine 75 mg at bedtime was started.    Pt states that the mirtazapine is helping him sleep, but the hydroxyzine is not helping his anxiety.    Pt was hospitalized on 7/29/2021 for CHF, and was put on Lasix.    Pt is now reporting that he has 8/10 pain with urination. He said it is a burning pain, and he sometimes has to stop the flow to ease the pain.  He also said that he sometimes has urinary incontinence.  Pt is afebrile, and denies any hematuria.  He has increased urinary frequency, but he is taking Lasix for CHF.    Per protocol, pt advised to be evaluated at [facility] walk-in-care.  Tami Millan RN 08/06/21 3:53 PM  [facility] Nurse Advisor    Reason for Disposition    SEVERE pain with urination    Additional Information    Negative: Shock suspected (e.g., cold/pale/clammy skin, too weak to stand, low BP, rapid pulse)    Negative: Sounds like a life-threatening emergency to the triager    Negative: Unable to urinate (or only a few drops) and bladder feels very full    Negative: Swollen scrotum    Negative: Pain in scrotum or testicle that persists > 1 hour    Negative: Fever > 100.4 F (38.0 C)    Negative: Vomiting    Negative: Patient sounds very sick or weak to the triager    Protocols used: URINATION PAIN - MALE-A-OH

## 2022-01-01 DIAGNOSIS — J45.30 MILD PERSISTENT ASTHMA WITHOUT COMPLICATION: ICD-10-CM

## 2022-01-01 DIAGNOSIS — F41.1 GENERALIZED ANXIETY DISORDER: ICD-10-CM

## 2022-01-01 RX ORDER — BUDESONIDE AND FORMOTEROL FUMARATE DIHYDRATE 160; 4.5 UG/1; UG/1
AEROSOL RESPIRATORY (INHALATION)
Qty: 10.2 G | Refills: 3 | Status: SHIPPED | OUTPATIENT
Start: 2022-01-01

## 2022-01-01 RX ORDER — HYDROXYZINE HYDROCHLORIDE 25 MG/1
TABLET, FILM COATED ORAL
Qty: 270 TABLET | Refills: 1 | Status: SHIPPED | OUTPATIENT
Start: 2022-01-01

## 2022-03-28 NOTE — TELEPHONE ENCOUNTER
"Last Written Prescription Date:  7/29/21  Last Fill Quantity: 60,  # refills: 3   Last office visit provider:  7/29/21     Requested Prescriptions   Pending Prescriptions Disp Refills     budesonide-formoterol (SYMBICORT) 160-4.5 MCG/ACT Inhaler [Pharmacy Med Name: BUDESONIDE/FORM 160/4.5MCG(120 INH)] 10.2 g 3     Sig: INHALE 1 PUFF INTO THE LUNGS TWICE DAILY       Inhaled Steroids Protocol Failed - 3/28/2022  3:00 PM        Failed - Asthma control assessment score within normal limits in last 6 months     Please review ACT score.           Failed - Recent (6 mo) or future (30 days) visit within the authorizing provider's specialty     Patient had office visit in the last 6 months or has a visit in the next 30 days with authorizing provider or within the authorizing provider's specialty.  See \"Patient Info\" tab in inbasket, or \"Choose Columns\" in Meds & Orders section of the refill encounter.            Passed - Patient is age 12 or older        Passed - Medication is active on med list       Long-Acting Beta Agonist Inhalers Protocol  Failed - 3/28/2022  3:00 PM        Failed - Asthma control assessment score within normal limits in last 6 months     Please review ACT score.           Failed - Recent (6 mo) or future (30 days) visit within the authorizing provider's specialty     Patient had office visit in the last 6 months or has a visit in the next 30 days with authorizing provider or within the authorizing provider's specialty.  See \"Patient Info\" tab in inbasket, or \"Choose Columns\" in Meds & Orders section of the refill encounter.            Passed - Patient is age 12 or older        Passed - Medication is active on med list           hydrOXYzine (ATARAX) 25 MG tablet [Pharmacy Med Name: HYDROXYZINE HCL 25MG TABS (WHITE)] 60 tablet 3     Sig: TAKE 1 TABLET(25 MG) BY MOUTH EVERY 8 HOURS AS NEEDED FOR ANXIETY       Antihistamines Protocol Passed - 3/25/2022  7:33 PM        Passed - Recent (12 mo) or future (30 " "days) visit within the authorizing provider's specialty     Patient has had an office visit with the authorizing provider or a provider within the authorizing providers department within the previous 12 mos or has a future within next 30 days. See \"Patient Info\" tab in inbasket, or \"Choose Columns\" in Meds & Orders section of the refill encounter.              Passed - Patient is age 3 or older     Apply age and/or weight-based dosing for peds patients age 3 and older.    Forward request to provider for patients under the age of 3.          Passed - Medication is active on med list             Ar Campo RN 03/28/22 3:00 PM  "

## 2022-03-28 NOTE — TELEPHONE ENCOUNTER
"Routing refill request to provider for review/approval because:  ACT score out of date.  Patient needs to be seen because it has been more than 6 months since last office visit.    Last Written Prescription Date:  7/29/21  Last Fill Quantity: 10.2 g,  # refills: 3   Last office visit provider:  7/29/21     Requested Prescriptions   Pending Prescriptions Disp Refills     budesonide-formoterol (SYMBICORT) 160-4.5 MCG/ACT Inhaler [Pharmacy Med Name: BUDESONIDE/FORM 160/4.5MCG(120 INH)] 10.2 g 3     Sig: INHALE 1 PUFF INTO THE LUNGS TWICE DAILY       Inhaled Steroids Protocol Failed - 3/28/2022  3:00 PM        Failed - Asthma control assessment score within normal limits in last 6 months     Please review ACT score.           Failed - Recent (6 mo) or future (30 days) visit within the authorizing provider's specialty     Patient had office visit in the last 6 months or has a visit in the next 30 days with authorizing provider or within the authorizing provider's specialty.  See \"Patient Info\" tab in inbasket, or \"Choose Columns\" in Meds & Orders section of the refill encounter.            Passed - Patient is age 12 or older        Passed - Medication is active on med list       Long-Acting Beta Agonist Inhalers Protocol  Failed - 3/28/2022  3:00 PM        Failed - Asthma control assessment score within normal limits in last 6 months     Please review ACT score.           Failed - Recent (6 mo) or future (30 days) visit within the authorizing provider's specialty     Patient had office visit in the last 6 months or has a visit in the next 30 days with authorizing provider or within the authorizing provider's specialty.  See \"Patient Info\" tab in inbasket, or \"Choose Columns\" in Meds & Orders section of the refill encounter.            Passed - Patient is age 12 or older        Passed - Medication is active on med list         Signed Prescriptions Disp Refills    hydrOXYzine (ATARAX) 25 MG tablet 270 tablet 1     Sig: " "TAKE 1 TABLET(25 MG) BY MOUTH EVERY 8 HOURS AS NEEDED FOR ANXIETY       Antihistamines Protocol Passed - 3/25/2022  7:33 PM        Passed - Recent (12 mo) or future (30 days) visit within the authorizing provider's specialty     Patient has had an office visit with the authorizing provider or a provider within the authorizing providers department within the previous 12 mos or has a future within next 30 days. See \"Patient Info\" tab in inbasket, or \"Choose Columns\" in Meds & Orders section of the refill encounter.              Passed - Patient is age 3 or older     Apply age and/or weight-based dosing for peds patients age 3 and older.    Forward request to provider for patients under the age of 3.          Passed - Medication is active on med list             Ar Campo RN 03/28/22 3:01 PM  "